# Patient Record
Sex: MALE | Race: OTHER | NOT HISPANIC OR LATINO | URBAN - METROPOLITAN AREA
[De-identification: names, ages, dates, MRNs, and addresses within clinical notes are randomized per-mention and may not be internally consistent; named-entity substitution may affect disease eponyms.]

---

## 2020-01-01 ENCOUNTER — INPATIENT (INPATIENT)
Facility: HOSPITAL | Age: 80
LOS: 7 days | End: 2020-04-09
Attending: STUDENT IN AN ORGANIZED HEALTH CARE EDUCATION/TRAINING PROGRAM | Admitting: STUDENT IN AN ORGANIZED HEALTH CARE EDUCATION/TRAINING PROGRAM
Payer: MEDICAID

## 2020-01-01 ENCOUNTER — OUTPATIENT (OUTPATIENT)
Dept: OUTPATIENT SERVICES | Facility: HOSPITAL | Age: 80
LOS: 1 days | End: 2020-01-01

## 2020-01-01 VITALS
TEMPERATURE: 97 F | RESPIRATION RATE: 29 BRPM | SYSTOLIC BLOOD PRESSURE: 122 MMHG | OXYGEN SATURATION: 92 % | HEART RATE: 120 BPM | DIASTOLIC BLOOD PRESSURE: 64 MMHG

## 2020-01-01 VITALS
DIASTOLIC BLOOD PRESSURE: 57 MMHG | RESPIRATION RATE: 97 BRPM | OXYGEN SATURATION: 96 % | SYSTOLIC BLOOD PRESSURE: 11 MMHG | HEART RATE: 116 BPM | TEMPERATURE: 101 F

## 2020-01-01 DIAGNOSIS — E11.9 TYPE 2 DIABETES MELLITUS WITHOUT COMPLICATIONS: ICD-10-CM

## 2020-01-01 DIAGNOSIS — B97.29 OTHER CORONAVIRUS AS THE CAUSE OF DISEASES CLASSIFIED ELSEWHERE: ICD-10-CM

## 2020-01-01 DIAGNOSIS — I10 ESSENTIAL (PRIMARY) HYPERTENSION: ICD-10-CM

## 2020-01-01 DIAGNOSIS — R68.89 OTHER GENERAL SYMPTOMS AND SIGNS: ICD-10-CM

## 2020-01-01 DIAGNOSIS — N17.9 ACUTE KIDNEY FAILURE, UNSPECIFIED: ICD-10-CM

## 2020-01-01 DIAGNOSIS — E87.0 HYPEROSMOLALITY AND HYPERNATREMIA: ICD-10-CM

## 2020-01-01 DIAGNOSIS — R06.02 SHORTNESS OF BREATH: ICD-10-CM

## 2020-01-01 DIAGNOSIS — Z71.89 OTHER SPECIFIED COUNSELING: ICD-10-CM

## 2020-01-01 LAB
ALBUMIN SERPL ELPH-MCNC: 3.2 G/DL — LOW (ref 3.3–5)
ALBUMIN SERPL ELPH-MCNC: 3.6 G/DL — SIGNIFICANT CHANGE UP (ref 3.3–5)
ALBUMIN SERPL ELPH-MCNC: 3.9 G/DL — SIGNIFICANT CHANGE UP (ref 3.3–5)
ALBUMIN SERPL ELPH-MCNC: 4.1 G/DL — SIGNIFICANT CHANGE UP (ref 3.3–5)
ALP SERPL-CCNC: 60 U/L — SIGNIFICANT CHANGE UP (ref 40–120)
ALP SERPL-CCNC: 62 U/L — SIGNIFICANT CHANGE UP (ref 40–120)
ALP SERPL-CCNC: 65 U/L — SIGNIFICANT CHANGE UP (ref 40–120)
ALP SERPL-CCNC: 66 U/L — SIGNIFICANT CHANGE UP (ref 40–120)
ALP SERPL-CCNC: 70 U/L — SIGNIFICANT CHANGE UP (ref 40–120)
ALP SERPL-CCNC: 75 U/L — SIGNIFICANT CHANGE UP (ref 40–120)
ALT FLD-CCNC: 25 U/L — SIGNIFICANT CHANGE UP (ref 4–41)
ALT FLD-CCNC: 28 U/L — SIGNIFICANT CHANGE UP (ref 4–41)
ALT FLD-CCNC: 34 U/L — SIGNIFICANT CHANGE UP (ref 4–41)
ALT FLD-CCNC: 35 U/L — SIGNIFICANT CHANGE UP (ref 4–41)
ALT FLD-CCNC: 42 U/L — HIGH (ref 4–41)
ALT FLD-CCNC: 43 U/L — HIGH (ref 4–41)
ANION GAP SERPL CALC-SCNC: 12 MMO/L — SIGNIFICANT CHANGE UP (ref 7–14)
ANION GAP SERPL CALC-SCNC: 13 MMO/L — SIGNIFICANT CHANGE UP (ref 7–14)
ANION GAP SERPL CALC-SCNC: 14 MMO/L — SIGNIFICANT CHANGE UP (ref 7–14)
ANION GAP SERPL CALC-SCNC: 14 MMO/L — SIGNIFICANT CHANGE UP (ref 7–14)
ANION GAP SERPL CALC-SCNC: 15 MMO/L — HIGH (ref 7–14)
ANION GAP SERPL CALC-SCNC: 16 MMO/L — HIGH (ref 7–14)
ANION GAP SERPL CALC-SCNC: 19 MMO/L — HIGH (ref 7–14)
ANISOCYTOSIS BLD QL: SLIGHT — SIGNIFICANT CHANGE UP
AST SERPL-CCNC: 34 U/L — SIGNIFICANT CHANGE UP (ref 4–40)
AST SERPL-CCNC: 41 U/L — HIGH (ref 4–40)
AST SERPL-CCNC: 47 U/L — HIGH (ref 4–40)
AST SERPL-CCNC: 54 U/L — HIGH (ref 4–40)
AST SERPL-CCNC: 60 U/L — HIGH (ref 4–40)
AST SERPL-CCNC: 60 U/L — HIGH (ref 4–40)
BASE EXCESS BLDV CALC-SCNC: 0.6 MMOL/L — SIGNIFICANT CHANGE UP
BASOPHILS # BLD AUTO: 0.01 K/UL — SIGNIFICANT CHANGE UP (ref 0–0.2)
BASOPHILS # BLD AUTO: 0.02 K/UL — SIGNIFICANT CHANGE UP (ref 0–0.2)
BASOPHILS # BLD AUTO: 0.02 K/UL — SIGNIFICANT CHANGE UP (ref 0–0.2)
BASOPHILS # BLD AUTO: 0.03 K/UL — SIGNIFICANT CHANGE UP (ref 0–0.2)
BASOPHILS NFR BLD AUTO: 0.2 % — SIGNIFICANT CHANGE UP (ref 0–2)
BASOPHILS NFR BLD AUTO: 0.3 % — SIGNIFICANT CHANGE UP (ref 0–2)
BASOPHILS NFR BLD AUTO: 0.3 % — SIGNIFICANT CHANGE UP (ref 0–2)
BASOPHILS NFR BLD AUTO: 0.4 % — SIGNIFICANT CHANGE UP (ref 0–2)
BASOPHILS NFR SPEC: 0 % — SIGNIFICANT CHANGE UP (ref 0–2)
BILIRUB SERPL-MCNC: 0.5 MG/DL — SIGNIFICANT CHANGE UP (ref 0.2–1.2)
BILIRUB SERPL-MCNC: 0.6 MG/DL — SIGNIFICANT CHANGE UP (ref 0.2–1.2)
BILIRUB SERPL-MCNC: 0.7 MG/DL — SIGNIFICANT CHANGE UP (ref 0.2–1.2)
BILIRUB SERPL-MCNC: 0.9 MG/DL — SIGNIFICANT CHANGE UP (ref 0.2–1.2)
BLASTS # FLD: 0 % — SIGNIFICANT CHANGE UP (ref 0–0)
BLOOD GAS VENOUS - CREATININE: 1.6 MG/DL — HIGH (ref 0.5–1.3)
BUN SERPL-MCNC: 26 MG/DL — HIGH (ref 7–23)
BUN SERPL-MCNC: 32 MG/DL — HIGH (ref 7–23)
BUN SERPL-MCNC: 34 MG/DL — HIGH (ref 7–23)
BUN SERPL-MCNC: 34 MG/DL — HIGH (ref 7–23)
BUN SERPL-MCNC: 37 MG/DL — HIGH (ref 7–23)
BUN SERPL-MCNC: 40 MG/DL — HIGH (ref 7–23)
BUN SERPL-MCNC: 40 MG/DL — HIGH (ref 7–23)
BUN SERPL-MCNC: 41 MG/DL — HIGH (ref 7–23)
BUN SERPL-MCNC: 41 MG/DL — HIGH (ref 7–23)
BUN SERPL-MCNC: 68 MG/DL — HIGH (ref 7–23)
CALCIUM SERPL-MCNC: 10 MG/DL — SIGNIFICANT CHANGE UP (ref 8.4–10.5)
CALCIUM SERPL-MCNC: 10.2 MG/DL — SIGNIFICANT CHANGE UP (ref 8.4–10.5)
CALCIUM SERPL-MCNC: 10.2 MG/DL — SIGNIFICANT CHANGE UP (ref 8.4–10.5)
CALCIUM SERPL-MCNC: 10.5 MG/DL — SIGNIFICANT CHANGE UP (ref 8.4–10.5)
CALCIUM SERPL-MCNC: 9.7 MG/DL — SIGNIFICANT CHANGE UP (ref 8.4–10.5)
CALCIUM SERPL-MCNC: 9.8 MG/DL — SIGNIFICANT CHANGE UP (ref 8.4–10.5)
CALCIUM SERPL-MCNC: 9.9 MG/DL — SIGNIFICANT CHANGE UP (ref 8.4–10.5)
CALCIUM SERPL-MCNC: 9.9 MG/DL — SIGNIFICANT CHANGE UP (ref 8.4–10.5)
CHLORIDE BLDV-SCNC: 104 MMOL/L — SIGNIFICANT CHANGE UP (ref 96–108)
CHLORIDE SERPL-SCNC: 101 MMOL/L — SIGNIFICANT CHANGE UP (ref 98–107)
CHLORIDE SERPL-SCNC: 101 MMOL/L — SIGNIFICANT CHANGE UP (ref 98–107)
CHLORIDE SERPL-SCNC: 104 MMOL/L — SIGNIFICANT CHANGE UP (ref 98–107)
CHLORIDE SERPL-SCNC: 106 MMOL/L — SIGNIFICANT CHANGE UP (ref 98–107)
CHLORIDE SERPL-SCNC: 114 MMOL/L — HIGH (ref 98–107)
CHLORIDE SERPL-SCNC: 115 MMOL/L — HIGH (ref 98–107)
CHLORIDE SERPL-SCNC: 97 MMOL/L — LOW (ref 98–107)
CHLORIDE SERPL-SCNC: 99 MMOL/L — SIGNIFICANT CHANGE UP (ref 98–107)
CO2 SERPL-SCNC: 16 MMOL/L — LOW (ref 22–31)
CO2 SERPL-SCNC: 20 MMOL/L — LOW (ref 22–31)
CO2 SERPL-SCNC: 20 MMOL/L — LOW (ref 22–31)
CO2 SERPL-SCNC: 21 MMOL/L — LOW (ref 22–31)
CO2 SERPL-SCNC: 21 MMOL/L — LOW (ref 22–31)
CO2 SERPL-SCNC: 22 MMOL/L — SIGNIFICANT CHANGE UP (ref 22–31)
CREAT SERPL-MCNC: 1.33 MG/DL — HIGH (ref 0.5–1.3)
CREAT SERPL-MCNC: 1.33 MG/DL — HIGH (ref 0.5–1.3)
CREAT SERPL-MCNC: 1.39 MG/DL — HIGH (ref 0.5–1.3)
CREAT SERPL-MCNC: 1.4 MG/DL — HIGH (ref 0.5–1.3)
CREAT SERPL-MCNC: 1.41 MG/DL — HIGH (ref 0.5–1.3)
CREAT SERPL-MCNC: 1.46 MG/DL — HIGH (ref 0.5–1.3)
CREAT SERPL-MCNC: 1.6 MG/DL — HIGH (ref 0.5–1.3)
CREAT SERPL-MCNC: 1.63 MG/DL — HIGH (ref 0.5–1.3)
CREAT SERPL-MCNC: 1.63 MG/DL — HIGH (ref 0.5–1.3)
CREAT SERPL-MCNC: 1.93 MG/DL — HIGH (ref 0.5–1.3)
CRP SERPL-MCNC: 152.6 MG/L — HIGH
CRP SERPL-MCNC: 177.4 MG/L — HIGH
CRP SERPL-MCNC: 325.7 MG/L — HIGH
D DIMER BLD IA.RAPID-MCNC: 1225 NG/ML — SIGNIFICANT CHANGE UP
D DIMER BLD IA.RAPID-MCNC: 456 NG/ML — SIGNIFICANT CHANGE UP
EOSINOPHIL # BLD AUTO: 0 K/UL — SIGNIFICANT CHANGE UP (ref 0–0.5)
EOSINOPHIL # BLD AUTO: 0.12 K/UL — SIGNIFICANT CHANGE UP (ref 0–0.5)
EOSINOPHIL # BLD AUTO: 0.14 K/UL — SIGNIFICANT CHANGE UP (ref 0–0.5)
EOSINOPHIL NFR BLD AUTO: 0 % — SIGNIFICANT CHANGE UP (ref 0–6)
EOSINOPHIL NFR BLD AUTO: 2.1 % — SIGNIFICANT CHANGE UP (ref 0–6)
EOSINOPHIL NFR BLD AUTO: 2.3 % — SIGNIFICANT CHANGE UP (ref 0–6)
EOSINOPHIL NFR FLD: 0 % — SIGNIFICANT CHANGE UP (ref 0–6)
FERRITIN SERPL-MCNC: 1643 NG/ML — HIGH (ref 30–400)
FERRITIN SERPL-MCNC: 2355 NG/ML — HIGH (ref 30–400)
FERRITIN SERPL-MCNC: 3342 NG/ML — HIGH (ref 30–400)
FERRITIN SERPL-MCNC: 807.8 NG/ML — HIGH (ref 30–400)
FERRITIN SERPL-MCNC: 9071 NG/ML — HIGH (ref 30–400)
GAS PNL BLDV: 135 MMOL/L — LOW (ref 136–146)
GIANT PLATELETS BLD QL SMEAR: PRESENT — SIGNIFICANT CHANGE UP
GLUCOSE BLDC GLUCOMTR-MCNC: 112 MG/DL — HIGH (ref 70–99)
GLUCOSE BLDC GLUCOMTR-MCNC: 115 MG/DL — HIGH (ref 70–99)
GLUCOSE BLDC GLUCOMTR-MCNC: 116 MG/DL — HIGH (ref 70–99)
GLUCOSE BLDC GLUCOMTR-MCNC: 119 MG/DL — HIGH (ref 70–99)
GLUCOSE BLDC GLUCOMTR-MCNC: 125 MG/DL — HIGH (ref 70–99)
GLUCOSE BLDC GLUCOMTR-MCNC: 127 MG/DL — HIGH (ref 70–99)
GLUCOSE BLDC GLUCOMTR-MCNC: 128 MG/DL — HIGH (ref 70–99)
GLUCOSE BLDC GLUCOMTR-MCNC: 132 MG/DL — HIGH (ref 70–99)
GLUCOSE BLDC GLUCOMTR-MCNC: 135 MG/DL — HIGH (ref 70–99)
GLUCOSE BLDC GLUCOMTR-MCNC: 142 MG/DL — HIGH (ref 70–99)
GLUCOSE BLDC GLUCOMTR-MCNC: 144 MG/DL — HIGH (ref 70–99)
GLUCOSE BLDC GLUCOMTR-MCNC: 146 MG/DL — HIGH (ref 70–99)
GLUCOSE BLDC GLUCOMTR-MCNC: 152 MG/DL — HIGH (ref 70–99)
GLUCOSE BLDC GLUCOMTR-MCNC: 153 MG/DL — HIGH (ref 70–99)
GLUCOSE BLDC GLUCOMTR-MCNC: 161 MG/DL — HIGH (ref 70–99)
GLUCOSE BLDC GLUCOMTR-MCNC: 170 MG/DL — HIGH (ref 70–99)
GLUCOSE BLDC GLUCOMTR-MCNC: 172 MG/DL — HIGH (ref 70–99)
GLUCOSE BLDC GLUCOMTR-MCNC: 175 MG/DL — HIGH (ref 70–99)
GLUCOSE BLDC GLUCOMTR-MCNC: 181 MG/DL — HIGH (ref 70–99)
GLUCOSE BLDC GLUCOMTR-MCNC: 187 MG/DL — HIGH (ref 70–99)
GLUCOSE BLDC GLUCOMTR-MCNC: 187 MG/DL — HIGH (ref 70–99)
GLUCOSE BLDC GLUCOMTR-MCNC: 194 MG/DL — HIGH (ref 70–99)
GLUCOSE BLDC GLUCOMTR-MCNC: 199 MG/DL — HIGH (ref 70–99)
GLUCOSE BLDC GLUCOMTR-MCNC: 216 MG/DL — HIGH (ref 70–99)
GLUCOSE BLDC GLUCOMTR-MCNC: 244 MG/DL — HIGH (ref 70–99)
GLUCOSE BLDC GLUCOMTR-MCNC: 260 MG/DL — HIGH (ref 70–99)
GLUCOSE BLDC GLUCOMTR-MCNC: 271 MG/DL — HIGH (ref 70–99)
GLUCOSE BLDC GLUCOMTR-MCNC: 305 MG/DL — HIGH (ref 70–99)
GLUCOSE BLDV-MCNC: 161 MG/DL — HIGH (ref 70–99)
GLUCOSE SERPL-MCNC: 109 MG/DL — HIGH (ref 70–99)
GLUCOSE SERPL-MCNC: 122 MG/DL — HIGH (ref 70–99)
GLUCOSE SERPL-MCNC: 122 MG/DL — HIGH (ref 70–99)
GLUCOSE SERPL-MCNC: 123 MG/DL — HIGH (ref 70–99)
GLUCOSE SERPL-MCNC: 123 MG/DL — HIGH (ref 70–99)
GLUCOSE SERPL-MCNC: 128 MG/DL — HIGH (ref 70–99)
GLUCOSE SERPL-MCNC: 146 MG/DL — HIGH (ref 70–99)
GLUCOSE SERPL-MCNC: 163 MG/DL — HIGH (ref 70–99)
GLUCOSE SERPL-MCNC: 211 MG/DL — HIGH (ref 70–99)
GLUCOSE SERPL-MCNC: 277 MG/DL — HIGH (ref 70–99)
HBA1C BLD-MCNC: 7 % — HIGH (ref 4–5.6)
HCO3 BLDV-SCNC: 23 MMOL/L — SIGNIFICANT CHANGE UP (ref 20–27)
HCT VFR BLD CALC: 34 % — LOW (ref 39–50)
HCT VFR BLD CALC: 36.4 % — LOW (ref 39–50)
HCT VFR BLD CALC: 36.9 % — LOW (ref 39–50)
HCT VFR BLD CALC: 38.7 % — LOW (ref 39–50)
HCT VFR BLD CALC: 39.6 % — SIGNIFICANT CHANGE UP (ref 39–50)
HCT VFR BLD CALC: 39.9 % — SIGNIFICANT CHANGE UP (ref 39–50)
HCT VFR BLD CALC: 39.9 % — SIGNIFICANT CHANGE UP (ref 39–50)
HCT VFR BLDV CALC: 42.1 % — SIGNIFICANT CHANGE UP (ref 39–51)
HGB BLD-MCNC: 11.3 G/DL — LOW (ref 13–17)
HGB BLD-MCNC: 12 G/DL — LOW (ref 13–17)
HGB BLD-MCNC: 12.2 G/DL — LOW (ref 13–17)
HGB BLD-MCNC: 12.7 G/DL — LOW (ref 13–17)
HGB BLD-MCNC: 12.7 G/DL — LOW (ref 13–17)
HGB BLD-MCNC: 13.4 G/DL — SIGNIFICANT CHANGE UP (ref 13–17)
HGB BLD-MCNC: 13.7 G/DL — SIGNIFICANT CHANGE UP (ref 13–17)
HGB BLD-MCNC: 13.7 G/DL — SIGNIFICANT CHANGE UP (ref 13–17)
HGB BLD-MCNC: 13.8 G/DL — SIGNIFICANT CHANGE UP (ref 13–17)
HGB BLDV-MCNC: 13.7 G/DL — SIGNIFICANT CHANGE UP (ref 13–17)
IMM GRANULOCYTES NFR BLD AUTO: 0.3 % — SIGNIFICANT CHANGE UP (ref 0–1.5)
IMM GRANULOCYTES NFR BLD AUTO: 0.3 % — SIGNIFICANT CHANGE UP (ref 0–1.5)
IMM GRANULOCYTES NFR BLD AUTO: 0.5 % — SIGNIFICANT CHANGE UP (ref 0–1.5)
IMM GRANULOCYTES NFR BLD AUTO: 0.5 % — SIGNIFICANT CHANGE UP (ref 0–1.5)
IMM GRANULOCYTES NFR BLD AUTO: 0.7 % — SIGNIFICANT CHANGE UP (ref 0–1.5)
IMM GRANULOCYTES NFR BLD AUTO: 1.3 % — SIGNIFICANT CHANGE UP (ref 0–1.5)
LACTATE BLDV-MCNC: 2.1 MMOL/L — HIGH (ref 0.5–2)
LYMPHOCYTES # BLD AUTO: 0.95 K/UL — LOW (ref 1–3.3)
LYMPHOCYTES # BLD AUTO: 0.95 K/UL — LOW (ref 1–3.3)
LYMPHOCYTES # BLD AUTO: 1.02 K/UL — SIGNIFICANT CHANGE UP (ref 1–3.3)
LYMPHOCYTES # BLD AUTO: 1.05 K/UL — SIGNIFICANT CHANGE UP (ref 1–3.3)
LYMPHOCYTES # BLD AUTO: 1.19 K/UL — SIGNIFICANT CHANGE UP (ref 1–3.3)
LYMPHOCYTES # BLD AUTO: 1.35 K/UL — SIGNIFICANT CHANGE UP (ref 1–3.3)
LYMPHOCYTES # BLD AUTO: 14.1 % — SIGNIFICANT CHANGE UP (ref 13–44)
LYMPHOCYTES # BLD AUTO: 15.8 % — SIGNIFICANT CHANGE UP (ref 13–44)
LYMPHOCYTES # BLD AUTO: 16.8 % — SIGNIFICANT CHANGE UP (ref 13–44)
LYMPHOCYTES # BLD AUTO: 17 % — SIGNIFICANT CHANGE UP (ref 13–44)
LYMPHOCYTES # BLD AUTO: 18 % — SIGNIFICANT CHANGE UP (ref 13–44)
LYMPHOCYTES # BLD AUTO: 21 % — SIGNIFICANT CHANGE UP (ref 13–44)
LYMPHOCYTES NFR SPEC AUTO: 7.2 % — LOW (ref 13–44)
MAGNESIUM SERPL-MCNC: 1.9 MG/DL — SIGNIFICANT CHANGE UP (ref 1.6–2.6)
MAGNESIUM SERPL-MCNC: 2 MG/DL — SIGNIFICANT CHANGE UP (ref 1.6–2.6)
MAGNESIUM SERPL-MCNC: 2 MG/DL — SIGNIFICANT CHANGE UP (ref 1.6–2.6)
MAGNESIUM SERPL-MCNC: 2.2 MG/DL — SIGNIFICANT CHANGE UP (ref 1.6–2.6)
MAGNESIUM SERPL-MCNC: 2.6 MG/DL — SIGNIFICANT CHANGE UP (ref 1.6–2.6)
MCHC RBC-ENTMCNC: 29.6 PG — SIGNIFICANT CHANGE UP (ref 27–34)
MCHC RBC-ENTMCNC: 29.9 PG — SIGNIFICANT CHANGE UP (ref 27–34)
MCHC RBC-ENTMCNC: 30.2 PG — SIGNIFICANT CHANGE UP (ref 27–34)
MCHC RBC-ENTMCNC: 30.4 PG — SIGNIFICANT CHANGE UP (ref 27–34)
MCHC RBC-ENTMCNC: 30.4 PG — SIGNIFICANT CHANGE UP (ref 27–34)
MCHC RBC-ENTMCNC: 30.6 PG — SIGNIFICANT CHANGE UP (ref 27–34)
MCHC RBC-ENTMCNC: 30.9 PG — SIGNIFICANT CHANGE UP (ref 27–34)
MCHC RBC-ENTMCNC: 33 % — SIGNIFICANT CHANGE UP (ref 32–36)
MCHC RBC-ENTMCNC: 33.1 % — SIGNIFICANT CHANGE UP (ref 32–36)
MCHC RBC-ENTMCNC: 33.2 % — SIGNIFICANT CHANGE UP (ref 32–36)
MCHC RBC-ENTMCNC: 34.3 % — SIGNIFICANT CHANGE UP (ref 32–36)
MCHC RBC-ENTMCNC: 34.4 % — SIGNIFICANT CHANGE UP (ref 32–36)
MCHC RBC-ENTMCNC: 34.4 % — SIGNIFICANT CHANGE UP (ref 32–36)
MCHC RBC-ENTMCNC: 34.6 % — SIGNIFICANT CHANGE UP (ref 32–36)
MCV RBC AUTO: 87.8 FL — SIGNIFICANT CHANGE UP (ref 80–100)
MCV RBC AUTO: 87.9 FL — SIGNIFICANT CHANGE UP (ref 80–100)
MCV RBC AUTO: 88.9 FL — SIGNIFICANT CHANGE UP (ref 80–100)
MCV RBC AUTO: 88.9 FL — SIGNIFICANT CHANGE UP (ref 80–100)
MCV RBC AUTO: 89.3 FL — SIGNIFICANT CHANGE UP (ref 80–100)
MCV RBC AUTO: 89.4 FL — SIGNIFICANT CHANGE UP (ref 80–100)
MCV RBC AUTO: 89.7 FL — SIGNIFICANT CHANGE UP (ref 80–100)
MCV RBC AUTO: 90.4 FL — SIGNIFICANT CHANGE UP (ref 80–100)
MCV RBC AUTO: 90.9 FL — SIGNIFICANT CHANGE UP (ref 80–100)
METAMYELOCYTES # FLD: 0 % — SIGNIFICANT CHANGE UP (ref 0–1)
MICROCYTES BLD QL: SLIGHT — SIGNIFICANT CHANGE UP
MONOCYTES # BLD AUTO: 0.34 K/UL — SIGNIFICANT CHANGE UP (ref 0–0.9)
MONOCYTES # BLD AUTO: 0.41 K/UL — SIGNIFICANT CHANGE UP (ref 0–0.9)
MONOCYTES # BLD AUTO: 0.44 K/UL — SIGNIFICANT CHANGE UP (ref 0–0.9)
MONOCYTES # BLD AUTO: 0.45 K/UL — SIGNIFICANT CHANGE UP (ref 0–0.9)
MONOCYTES # BLD AUTO: 0.46 K/UL — SIGNIFICANT CHANGE UP (ref 0–0.9)
MONOCYTES # BLD AUTO: 0.59 K/UL — SIGNIFICANT CHANGE UP (ref 0–0.9)
MONOCYTES NFR BLD AUTO: 4.7 % — SIGNIFICANT CHANGE UP (ref 2–14)
MONOCYTES NFR BLD AUTO: 6 % — SIGNIFICANT CHANGE UP (ref 2–14)
MONOCYTES NFR BLD AUTO: 7.2 % — SIGNIFICANT CHANGE UP (ref 2–14)
MONOCYTES NFR BLD AUTO: 7.4 % — SIGNIFICANT CHANGE UP (ref 2–14)
MONOCYTES NFR BLD AUTO: 7.8 % — SIGNIFICANT CHANGE UP (ref 2–14)
MONOCYTES NFR BLD AUTO: 9.8 % — SIGNIFICANT CHANGE UP (ref 2–14)
MONOCYTES NFR BLD: 13.5 % — HIGH (ref 2–9)
MYELOCYTES NFR BLD: 0 % — SIGNIFICANT CHANGE UP (ref 0–0)
NEUTROPHIL AB SER-ACNC: 70.3 % — SIGNIFICANT CHANGE UP (ref 43–77)
NEUTROPHILS # BLD AUTO: 3.92 K/UL — SIGNIFICANT CHANGE UP (ref 1.8–7.4)
NEUTROPHILS # BLD AUTO: 4.2 K/UL — SIGNIFICANT CHANGE UP (ref 1.8–7.4)
NEUTROPHILS # BLD AUTO: 4.25 K/UL — SIGNIFICANT CHANGE UP (ref 1.8–7.4)
NEUTROPHILS # BLD AUTO: 4.3 K/UL — SIGNIFICANT CHANGE UP (ref 1.8–7.4)
NEUTROPHILS # BLD AUTO: 4.65 K/UL — SIGNIFICANT CHANGE UP (ref 1.8–7.4)
NEUTROPHILS # BLD AUTO: 7.64 K/UL — HIGH (ref 1.8–7.4)
NEUTROPHILS NFR BLD AUTO: 69 % — SIGNIFICANT CHANGE UP (ref 43–77)
NEUTROPHILS NFR BLD AUTO: 71.5 % — SIGNIFICANT CHANGE UP (ref 43–77)
NEUTROPHILS NFR BLD AUTO: 74.5 % — SIGNIFICANT CHANGE UP (ref 43–77)
NEUTROPHILS NFR BLD AUTO: 75.1 % — SIGNIFICANT CHANGE UP (ref 43–77)
NEUTROPHILS NFR BLD AUTO: 75.1 % — SIGNIFICANT CHANGE UP (ref 43–77)
NEUTROPHILS NFR BLD AUTO: 79.6 % — HIGH (ref 43–77)
NEUTS BAND # BLD: 3.6 % — SIGNIFICANT CHANGE UP (ref 0–6)
NRBC # BLD: 1 /100WBC — SIGNIFICANT CHANGE UP
NRBC # FLD: 0 K/UL — SIGNIFICANT CHANGE UP (ref 0–0)
NRBC # FLD: 0.08 K/UL — SIGNIFICANT CHANGE UP (ref 0–0)
NRBC # FLD: 0.86 K/UL — SIGNIFICANT CHANGE UP (ref 0–0)
NRBC FLD-RTO: 2.9 — SIGNIFICANT CHANGE UP
OTHER - HEMATOLOGY %: 0 — SIGNIFICANT CHANGE UP
PCO2 BLDV: 46 MMHG — SIGNIFICANT CHANGE UP (ref 41–51)
PH BLDV: 7.36 PH — SIGNIFICANT CHANGE UP (ref 7.32–7.43)
PHOSPHATE SERPL-MCNC: 2.7 MG/DL — SIGNIFICANT CHANGE UP (ref 2.5–4.5)
PHOSPHATE SERPL-MCNC: 2.7 MG/DL — SIGNIFICANT CHANGE UP (ref 2.5–4.5)
PHOSPHATE SERPL-MCNC: 3.1 MG/DL — SIGNIFICANT CHANGE UP (ref 2.5–4.5)
PHOSPHATE SERPL-MCNC: 3.4 MG/DL — SIGNIFICANT CHANGE UP (ref 2.5–4.5)
PLATELET # BLD AUTO: 185 K/UL — SIGNIFICANT CHANGE UP (ref 150–400)
PLATELET # BLD AUTO: 193 K/UL — SIGNIFICANT CHANGE UP (ref 150–400)
PLATELET # BLD AUTO: 195 K/UL — SIGNIFICANT CHANGE UP (ref 150–400)
PLATELET # BLD AUTO: 200 K/UL — SIGNIFICANT CHANGE UP (ref 150–400)
PLATELET # BLD AUTO: 206 K/UL — SIGNIFICANT CHANGE UP (ref 150–400)
PLATELET # BLD AUTO: 206 K/UL — SIGNIFICANT CHANGE UP (ref 150–400)
PLATELET # BLD AUTO: 210 K/UL — SIGNIFICANT CHANGE UP (ref 150–400)
PLATELET # BLD AUTO: 221 K/UL — SIGNIFICANT CHANGE UP (ref 150–400)
PLATELET # BLD AUTO: 280 K/UL — SIGNIFICANT CHANGE UP (ref 150–400)
PLATELET COUNT - ESTIMATE: NORMAL — SIGNIFICANT CHANGE UP
PMV BLD: 10.1 FL — SIGNIFICANT CHANGE UP (ref 7–13)
PMV BLD: 10.5 FL — SIGNIFICANT CHANGE UP (ref 7–13)
PMV BLD: 10.7 FL — SIGNIFICANT CHANGE UP (ref 7–13)
PMV BLD: 11.4 FL — SIGNIFICANT CHANGE UP (ref 7–13)
PMV BLD: 11.5 FL — SIGNIFICANT CHANGE UP (ref 7–13)
PMV BLD: 11.6 FL — SIGNIFICANT CHANGE UP (ref 7–13)
PMV BLD: 11.6 FL — SIGNIFICANT CHANGE UP (ref 7–13)
PO2 BLDV: 26 MMHG — LOW (ref 35–40)
POIKILOCYTOSIS BLD QL AUTO: SLIGHT — SIGNIFICANT CHANGE UP
POLYCHROMASIA BLD QL SMEAR: SLIGHT — SIGNIFICANT CHANGE UP
POTASSIUM BLDV-SCNC: 4.1 MMOL/L — SIGNIFICANT CHANGE UP (ref 3.4–4.5)
POTASSIUM SERPL-MCNC: 4.1 MMOL/L — SIGNIFICANT CHANGE UP (ref 3.5–5.3)
POTASSIUM SERPL-MCNC: 4.2 MMOL/L — SIGNIFICANT CHANGE UP (ref 3.5–5.3)
POTASSIUM SERPL-MCNC: 4.4 MMOL/L — SIGNIFICANT CHANGE UP (ref 3.5–5.3)
POTASSIUM SERPL-MCNC: 4.4 MMOL/L — SIGNIFICANT CHANGE UP (ref 3.5–5.3)
POTASSIUM SERPL-MCNC: 4.5 MMOL/L — SIGNIFICANT CHANGE UP (ref 3.5–5.3)
POTASSIUM SERPL-MCNC: 4.6 MMOL/L — SIGNIFICANT CHANGE UP (ref 3.5–5.3)
POTASSIUM SERPL-MCNC: 4.6 MMOL/L — SIGNIFICANT CHANGE UP (ref 3.5–5.3)
POTASSIUM SERPL-MCNC: 4.7 MMOL/L — SIGNIFICANT CHANGE UP (ref 3.5–5.3)
POTASSIUM SERPL-MCNC: 4.8 MMOL/L — SIGNIFICANT CHANGE UP (ref 3.5–5.3)
POTASSIUM SERPL-SCNC: 4.1 MMOL/L — SIGNIFICANT CHANGE UP (ref 3.5–5.3)
POTASSIUM SERPL-SCNC: 4.2 MMOL/L — SIGNIFICANT CHANGE UP (ref 3.5–5.3)
POTASSIUM SERPL-SCNC: 4.4 MMOL/L — SIGNIFICANT CHANGE UP (ref 3.5–5.3)
POTASSIUM SERPL-SCNC: 4.4 MMOL/L — SIGNIFICANT CHANGE UP (ref 3.5–5.3)
POTASSIUM SERPL-SCNC: 4.5 MMOL/L — SIGNIFICANT CHANGE UP (ref 3.5–5.3)
POTASSIUM SERPL-SCNC: 4.6 MMOL/L — SIGNIFICANT CHANGE UP (ref 3.5–5.3)
POTASSIUM SERPL-SCNC: 4.6 MMOL/L — SIGNIFICANT CHANGE UP (ref 3.5–5.3)
POTASSIUM SERPL-SCNC: 4.7 MMOL/L — SIGNIFICANT CHANGE UP (ref 3.5–5.3)
POTASSIUM SERPL-SCNC: 4.8 MMOL/L — SIGNIFICANT CHANGE UP (ref 3.5–5.3)
PROCALCITONIN SERPL-MCNC: 0.49 NG/ML — HIGH (ref 0.02–0.1)
PROCALCITONIN SERPL-MCNC: 0.59 NG/ML — HIGH (ref 0.02–0.1)
PROCALCITONIN SERPL-MCNC: 0.79 NG/ML — HIGH (ref 0.02–0.1)
PROCALCITONIN SERPL-MCNC: 1.39 NG/ML — HIGH (ref 0.02–0.1)
PROCALCITONIN SERPL-MCNC: 2.79 NG/ML — HIGH (ref 0.02–0.1)
PROMYELOCYTES # FLD: 0 % — SIGNIFICANT CHANGE UP (ref 0–0)
PROT SERPL-MCNC: 7.3 G/DL — SIGNIFICANT CHANGE UP (ref 6–8.3)
PROT SERPL-MCNC: 7.5 G/DL — SIGNIFICANT CHANGE UP (ref 6–8.3)
PROT SERPL-MCNC: 7.7 G/DL — SIGNIFICANT CHANGE UP (ref 6–8.3)
PROT SERPL-MCNC: 7.9 G/DL — SIGNIFICANT CHANGE UP (ref 6–8.3)
PROT SERPL-MCNC: 8 G/DL — SIGNIFICANT CHANGE UP (ref 6–8.3)
PROT SERPL-MCNC: 8.1 G/DL — SIGNIFICANT CHANGE UP (ref 6–8.3)
RBC # BLD: 3.74 M/UL — LOW (ref 4.2–5.8)
RBC # BLD: 4.06 M/UL — LOW (ref 4.2–5.8)
RBC # BLD: 4.08 M/UL — LOW (ref 4.2–5.8)
RBC # BLD: 4.15 M/UL — LOW (ref 4.2–5.8)
RBC # BLD: 4.15 M/UL — LOW (ref 4.2–5.8)
RBC # BLD: 4.33 M/UL — SIGNIFICANT CHANGE UP (ref 4.2–5.8)
RBC # BLD: 4.47 M/UL — SIGNIFICANT CHANGE UP (ref 4.2–5.8)
RBC # BLD: 4.51 M/UL — SIGNIFICANT CHANGE UP (ref 4.2–5.8)
RBC # BLD: 4.54 M/UL — SIGNIFICANT CHANGE UP (ref 4.2–5.8)
RBC # FLD: 12.8 % — SIGNIFICANT CHANGE UP (ref 10.3–14.5)
RBC # FLD: 12.9 % — SIGNIFICANT CHANGE UP (ref 10.3–14.5)
RBC # FLD: 13 % — SIGNIFICANT CHANGE UP (ref 10.3–14.5)
RBC # FLD: 13.1 % — SIGNIFICANT CHANGE UP (ref 10.3–14.5)
RBC # FLD: 13.2 % — SIGNIFICANT CHANGE UP (ref 10.3–14.5)
RBC # FLD: 14.4 % — SIGNIFICANT CHANGE UP (ref 10.3–14.5)
SAO2 % BLDV: 37.1 % — LOW (ref 60–85)
SARS-COV-2 RNA SPEC QL NAA+PROBE: DETECTED
SCHISTOCYTES BLD QL AUTO: SLIGHT — SIGNIFICANT CHANGE UP
SODIUM SERPL-SCNC: 133 MMOL/L — LOW (ref 135–145)
SODIUM SERPL-SCNC: 134 MMOL/L — LOW (ref 135–145)
SODIUM SERPL-SCNC: 137 MMOL/L — SIGNIFICANT CHANGE UP (ref 135–145)
SODIUM SERPL-SCNC: 137 MMOL/L — SIGNIFICANT CHANGE UP (ref 135–145)
SODIUM SERPL-SCNC: 141 MMOL/L — SIGNIFICANT CHANGE UP (ref 135–145)
SODIUM SERPL-SCNC: 142 MMOL/L — SIGNIFICANT CHANGE UP (ref 135–145)
SODIUM SERPL-SCNC: 147 MMOL/L — HIGH (ref 135–145)
SODIUM SERPL-SCNC: 150 MMOL/L — HIGH (ref 135–145)
TROPONIN T, HIGH SENSITIVITY: 16 NG/L — SIGNIFICANT CHANGE UP (ref ?–14)
TROPONIN T, HIGH SENSITIVITY: 19 NG/L — SIGNIFICANT CHANGE UP (ref ?–14)
VARIANT LYMPHS # BLD: 4.5 % — SIGNIFICANT CHANGE UP
WBC # BLD: 16.6 K/UL — HIGH (ref 3.8–10.5)
WBC # BLD: 29.21 K/UL — HIGH (ref 3.8–10.5)
WBC # BLD: 5.64 K/UL — SIGNIFICANT CHANGE UP (ref 3.8–10.5)
WBC # BLD: 5.66 K/UL — SIGNIFICANT CHANGE UP (ref 3.8–10.5)
WBC # BLD: 5.66 K/UL — SIGNIFICANT CHANGE UP (ref 3.8–10.5)
WBC # BLD: 5.68 K/UL — SIGNIFICANT CHANGE UP (ref 3.8–10.5)
WBC # BLD: 6.02 K/UL — SIGNIFICANT CHANGE UP (ref 3.8–10.5)
WBC # BLD: 6.19 K/UL — SIGNIFICANT CHANGE UP (ref 3.8–10.5)
WBC # BLD: 9.59 K/UL — SIGNIFICANT CHANGE UP (ref 3.8–10.5)
WBC # FLD AUTO: 16.6 K/UL — HIGH (ref 3.8–10.5)
WBC # FLD AUTO: 29.21 K/UL — HIGH (ref 3.8–10.5)
WBC # FLD AUTO: 5.64 K/UL — SIGNIFICANT CHANGE UP (ref 3.8–10.5)
WBC # FLD AUTO: 5.66 K/UL — SIGNIFICANT CHANGE UP (ref 3.8–10.5)
WBC # FLD AUTO: 5.66 K/UL — SIGNIFICANT CHANGE UP (ref 3.8–10.5)
WBC # FLD AUTO: 5.68 K/UL — SIGNIFICANT CHANGE UP (ref 3.8–10.5)
WBC # FLD AUTO: 6.02 K/UL — SIGNIFICANT CHANGE UP (ref 3.8–10.5)
WBC # FLD AUTO: 6.19 K/UL — SIGNIFICANT CHANGE UP (ref 3.8–10.5)
WBC # FLD AUTO: 9.59 K/UL — SIGNIFICANT CHANGE UP (ref 3.8–10.5)

## 2020-01-01 PROCEDURE — 99232 SBSQ HOSP IP/OBS MODERATE 35: CPT

## 2020-01-01 PROCEDURE — 71045 X-RAY EXAM CHEST 1 VIEW: CPT | Mod: 26

## 2020-01-01 PROCEDURE — 99233 SBSQ HOSP IP/OBS HIGH 50: CPT

## 2020-01-01 PROCEDURE — 99223 1ST HOSP IP/OBS HIGH 75: CPT

## 2020-01-01 PROCEDURE — 93970 EXTREMITY STUDY: CPT | Mod: 26

## 2020-01-01 RX ORDER — ENOXAPARIN SODIUM 100 MG/ML
90 INJECTION SUBCUTANEOUS EVERY 12 HOURS
Refills: 0 | Status: DISCONTINUED | OUTPATIENT
Start: 2020-01-01 | End: 2020-01-01

## 2020-01-01 RX ORDER — GLUCAGON INJECTION, SOLUTION 0.5 MG/.1ML
1 INJECTION, SOLUTION SUBCUTANEOUS ONCE
Refills: 0 | Status: DISCONTINUED | OUTPATIENT
Start: 2020-01-01 | End: 2020-01-01

## 2020-01-01 RX ORDER — ENOXAPARIN SODIUM 100 MG/ML
40 INJECTION SUBCUTANEOUS DAILY
Refills: 0 | Status: DISCONTINUED | OUTPATIENT
Start: 2020-01-01 | End: 2020-01-01

## 2020-01-01 RX ORDER — ACETAMINOPHEN 500 MG
650 TABLET ORAL EVERY 4 HOURS
Refills: 0 | Status: DISCONTINUED | OUTPATIENT
Start: 2020-01-01 | End: 2020-01-01

## 2020-01-01 RX ORDER — DEXTROSE 50 % IN WATER 50 %
25 SYRINGE (ML) INTRAVENOUS ONCE
Refills: 0 | Status: DISCONTINUED | OUTPATIENT
Start: 2020-01-01 | End: 2020-01-01

## 2020-01-01 RX ORDER — SODIUM CHLORIDE 9 MG/ML
1000 INJECTION, SOLUTION INTRAVENOUS
Refills: 0 | Status: DISCONTINUED | OUTPATIENT
Start: 2020-01-01 | End: 2020-01-01

## 2020-01-01 RX ORDER — INSULIN LISPRO 100/ML
VIAL (ML) SUBCUTANEOUS AT BEDTIME
Refills: 0 | Status: DISCONTINUED | OUTPATIENT
Start: 2020-01-01 | End: 2020-01-01

## 2020-01-01 RX ORDER — DEXTROSE 50 % IN WATER 50 %
12.5 SYRINGE (ML) INTRAVENOUS ONCE
Refills: 0 | Status: DISCONTINUED | OUTPATIENT
Start: 2020-01-01 | End: 2020-01-01

## 2020-01-01 RX ORDER — INSULIN LISPRO 100/ML
VIAL (ML) SUBCUTANEOUS
Refills: 0 | Status: DISCONTINUED | OUTPATIENT
Start: 2020-01-01 | End: 2020-01-01

## 2020-01-01 RX ORDER — DEXTROSE 50 % IN WATER 50 %
15 SYRINGE (ML) INTRAVENOUS ONCE
Refills: 0 | Status: DISCONTINUED | OUTPATIENT
Start: 2020-01-01 | End: 2020-01-01

## 2020-01-01 RX ORDER — LISINOPRIL 2.5 MG/1
10 TABLET ORAL DAILY
Refills: 0 | Status: DISCONTINUED | OUTPATIENT
Start: 2020-01-01 | End: 2020-01-01

## 2020-01-01 RX ORDER — ACETAMINOPHEN 500 MG
975 TABLET ORAL ONCE
Refills: 0 | Status: COMPLETED | OUTPATIENT
Start: 2020-01-01 | End: 2020-01-01

## 2020-01-01 RX ORDER — INSULIN GLARGINE 100 [IU]/ML
4 INJECTION, SOLUTION SUBCUTANEOUS AT BEDTIME
Refills: 0 | Status: DISCONTINUED | OUTPATIENT
Start: 2020-01-01 | End: 2020-01-01

## 2020-01-01 RX ORDER — TAMSULOSIN HYDROCHLORIDE 0.4 MG/1
0.4 CAPSULE ORAL AT BEDTIME
Refills: 0 | Status: DISCONTINUED | OUTPATIENT
Start: 2020-01-01 | End: 2020-01-01

## 2020-01-01 RX ORDER — TAMSULOSIN HYDROCHLORIDE 0.4 MG/1
1 CAPSULE ORAL
Qty: 0 | Refills: 0 | DISCHARGE

## 2020-01-01 RX ORDER — SODIUM CHLORIDE 9 MG/ML
500 INJECTION INTRAMUSCULAR; INTRAVENOUS; SUBCUTANEOUS ONCE
Refills: 0 | Status: COMPLETED | OUTPATIENT
Start: 2020-01-01 | End: 2020-01-01

## 2020-01-01 RX ORDER — HEPARIN SODIUM 5000 [USP'U]/ML
5000 INJECTION INTRAVENOUS; SUBCUTANEOUS EVERY 8 HOURS
Refills: 0 | Status: DISCONTINUED | OUTPATIENT
Start: 2020-01-01 | End: 2020-01-01

## 2020-01-01 RX ORDER — MORPHINE SULFATE 50 MG/1
1 CAPSULE, EXTENDED RELEASE ORAL ONCE
Refills: 0 | Status: DISCONTINUED | OUTPATIENT
Start: 2020-01-01 | End: 2020-01-01

## 2020-01-01 RX ORDER — PIPERACILLIN AND TAZOBACTAM 4; .5 G/20ML; G/20ML
3.38 INJECTION, POWDER, LYOPHILIZED, FOR SOLUTION INTRAVENOUS EVERY 8 HOURS
Refills: 0 | Status: DISCONTINUED | OUTPATIENT
Start: 2020-01-01 | End: 2020-01-01

## 2020-01-01 RX ORDER — INSULIN LISPRO 100/ML
2 VIAL (ML) SUBCUTANEOUS
Refills: 0 | Status: DISCONTINUED | OUTPATIENT
Start: 2020-01-01 | End: 2020-01-01

## 2020-01-01 RX ORDER — ACETAMINOPHEN 500 MG
2 TABLET ORAL
Qty: 30 | Refills: 0
Start: 2020-01-01

## 2020-01-01 RX ORDER — SODIUM CHLORIDE 9 MG/ML
1000 INJECTION INTRAMUSCULAR; INTRAVENOUS; SUBCUTANEOUS
Refills: 0 | Status: DISCONTINUED | OUTPATIENT
Start: 2020-01-01 | End: 2020-01-01

## 2020-01-01 RX ORDER — LISINOPRIL 2.5 MG/1
1 TABLET ORAL
Qty: 0 | Refills: 0 | DISCHARGE

## 2020-01-01 RX ADMIN — SODIUM CHLORIDE 500 MILLILITER(S): 9 INJECTION INTRAMUSCULAR; INTRAVENOUS; SUBCUTANEOUS at 03:39

## 2020-01-01 RX ADMIN — Medication 3: at 10:32

## 2020-01-01 RX ADMIN — PIPERACILLIN AND TAZOBACTAM 25 GRAM(S): 4; .5 INJECTION, POWDER, LYOPHILIZED, FOR SOLUTION INTRAVENOUS at 23:50

## 2020-01-01 RX ADMIN — HEPARIN SODIUM 5000 UNIT(S): 5000 INJECTION INTRAVENOUS; SUBCUTANEOUS at 05:41

## 2020-01-01 RX ADMIN — SODIUM CHLORIDE 60 MILLILITER(S): 9 INJECTION INTRAMUSCULAR; INTRAVENOUS; SUBCUTANEOUS at 23:10

## 2020-01-01 RX ADMIN — ENOXAPARIN SODIUM 90 MILLIGRAM(S): 100 INJECTION SUBCUTANEOUS at 10:32

## 2020-01-01 RX ADMIN — Medication 1: at 12:49

## 2020-01-01 RX ADMIN — HEPARIN SODIUM 5000 UNIT(S): 5000 INJECTION INTRAVENOUS; SUBCUTANEOUS at 22:50

## 2020-01-01 RX ADMIN — HEPARIN SODIUM 5000 UNIT(S): 5000 INJECTION INTRAVENOUS; SUBCUTANEOUS at 00:07

## 2020-01-01 RX ADMIN — ENOXAPARIN SODIUM 90 MILLIGRAM(S): 100 INJECTION SUBCUTANEOUS at 18:31

## 2020-01-01 RX ADMIN — Medication 3: at 13:06

## 2020-01-01 RX ADMIN — LISINOPRIL 10 MILLIGRAM(S): 2.5 TABLET ORAL at 06:44

## 2020-01-01 RX ADMIN — Medication 1: at 13:16

## 2020-01-01 RX ADMIN — HEPARIN SODIUM 5000 UNIT(S): 5000 INJECTION INTRAVENOUS; SUBCUTANEOUS at 05:45

## 2020-01-01 RX ADMIN — ENOXAPARIN SODIUM 40 MILLIGRAM(S): 100 INJECTION SUBCUTANEOUS at 12:41

## 2020-01-01 RX ADMIN — Medication 975 MILLIGRAM(S): at 23:56

## 2020-01-01 RX ADMIN — Medication 1: at 12:34

## 2020-01-01 RX ADMIN — TAMSULOSIN HYDROCHLORIDE 0.4 MILLIGRAM(S): 0.4 CAPSULE ORAL at 22:50

## 2020-01-01 RX ADMIN — HEPARIN SODIUM 5000 UNIT(S): 5000 INJECTION INTRAVENOUS; SUBCUTANEOUS at 13:24

## 2020-01-01 RX ADMIN — SODIUM CHLORIDE 60 MILLILITER(S): 9 INJECTION INTRAMUSCULAR; INTRAVENOUS; SUBCUTANEOUS at 16:08

## 2020-01-01 RX ADMIN — LISINOPRIL 10 MILLIGRAM(S): 2.5 TABLET ORAL at 04:38

## 2020-01-01 RX ADMIN — HEPARIN SODIUM 5000 UNIT(S): 5000 INJECTION INTRAVENOUS; SUBCUTANEOUS at 23:15

## 2020-01-01 RX ADMIN — HEPARIN SODIUM 5000 UNIT(S): 5000 INJECTION INTRAVENOUS; SUBCUTANEOUS at 13:48

## 2020-01-01 RX ADMIN — MORPHINE SULFATE 1 MILLIGRAM(S): 50 CAPSULE, EXTENDED RELEASE ORAL at 16:22

## 2020-01-01 RX ADMIN — PIPERACILLIN AND TAZOBACTAM 25 GRAM(S): 4; .5 INJECTION, POWDER, LYOPHILIZED, FOR SOLUTION INTRAVENOUS at 06:33

## 2020-01-01 RX ADMIN — Medication 2: at 18:26

## 2020-01-01 RX ADMIN — PIPERACILLIN AND TAZOBACTAM 25 GRAM(S): 4; .5 INJECTION, POWDER, LYOPHILIZED, FOR SOLUTION INTRAVENOUS at 13:07

## 2020-01-01 RX ADMIN — Medication 1: at 09:12

## 2020-01-01 RX ADMIN — HEPARIN SODIUM 5000 UNIT(S): 5000 INJECTION INTRAVENOUS; SUBCUTANEOUS at 04:46

## 2020-01-01 RX ADMIN — Medication 1: at 19:03

## 2020-01-01 RX ADMIN — HEPARIN SODIUM 5000 UNIT(S): 5000 INJECTION INTRAVENOUS; SUBCUTANEOUS at 04:44

## 2020-01-01 RX ADMIN — HEPARIN SODIUM 5000 UNIT(S): 5000 INJECTION INTRAVENOUS; SUBCUTANEOUS at 12:35

## 2020-01-01 RX ADMIN — Medication 2: at 12:40

## 2020-01-01 RX ADMIN — Medication 1: at 13:27

## 2020-01-01 RX ADMIN — HEPARIN SODIUM 5000 UNIT(S): 5000 INJECTION INTRAVENOUS; SUBCUTANEOUS at 12:53

## 2020-01-01 RX ADMIN — HEPARIN SODIUM 5000 UNIT(S): 5000 INJECTION INTRAVENOUS; SUBCUTANEOUS at 04:15

## 2020-01-01 RX ADMIN — HEPARIN SODIUM 5000 UNIT(S): 5000 INJECTION INTRAVENOUS; SUBCUTANEOUS at 23:11

## 2020-01-01 RX ADMIN — LISINOPRIL 10 MILLIGRAM(S): 2.5 TABLET ORAL at 05:41

## 2020-01-01 RX ADMIN — HEPARIN SODIUM 5000 UNIT(S): 5000 INJECTION INTRAVENOUS; SUBCUTANEOUS at 21:49

## 2020-01-01 RX ADMIN — ENOXAPARIN SODIUM 40 MILLIGRAM(S): 100 INJECTION SUBCUTANEOUS at 15:19

## 2020-04-01 NOTE — ED PROVIDER NOTE - OBJECTIVE STATEMENT
80 y/o M h/o HTN, DM p/w cough, fever x 4 days. Pt reports trouble breathing. Worse with ambulation or deep inspiration. Admits to dry cough. Wife currently admitted with COVID infection. O2 sat 92% on RA. Pt very tachypneic with ambulation, O2 sat 90% with ambulation. No abd pain, n/v/d, headache, dizziness. 78 y/o M h/o HTN, DM p/w cough, fever x 4 days. Pt reports trouble breathing. Worse with ambulation or deep inspiration. Admits to dry cough. Wife currently admitted with COVID infection. O2 sat 98% on RA. No abd pain, n/v/d, headache, dizziness.

## 2020-04-01 NOTE — ED PROVIDER NOTE - ATTENDING CONTRIBUTION TO CARE
79M dry cough fever cough sob.  O2 sat 98% RA.  LG temp.  Ambulating sat - aborted due to significant tachycardia and dizziness, near syncope.  Wife admitted for COVID.  Rx fluids, check labs, CXR, admit due to oxygen need in setting of likely COVID infection.    VS:  fever, tachycardia, mild hypoxia    GEN - malaise, mild resp distress   A+O x3   HEAD - NC/AT     ENT - PEERL, EOMI, mucous membranes dry , no discharge      NECK: Neck supple, non-tender without lymphadenopathy, no masses, no JVD  PULM - fine crackles bilat symmetric breath sounds  COR -  normal heart sounds    ABD - , ND, NT, soft,  BACK - no CVA tenderness, nontender spine     EXTREMS - no edema, no deformity, warm and well perfused    SKIN - no rash    or bruising      NEUROLOGIC - alert, face symmetric, speech fluent, sensation nl, motor no focal deficit.

## 2020-04-01 NOTE — ED PROVIDER NOTE - RESPIRATORY, MLM
Breath sounds clear. Tachypneic. 92% on RA. Breath sounds clear. Tachypneic. 98% on RA. Breathing comfortably on RA. NAD

## 2020-04-01 NOTE — ED PROVIDER NOTE - CLINICAL SUMMARY MEDICAL DECISION MAKING FREE TEXT BOX
likely COVID infection, given that wife is positive for COVID   plan  - labs  - covid swab  - cxr  - tylenol  - supplement oxygen  - reassess likely COVID infection, given that wife is positive for COVID. O2 sat 98%. Pt comfortable appearing. Will check amb sat.   plan  - labs  - covid swab  - cxr  - tylenol  - supplement oxygen  - reassess

## 2020-04-02 NOTE — H&P ADULT - PROBLEM SELECTOR PLAN 3
no baseline Cr.  elevated BUN/Cr. likely pre-renal in the clinical setting  s/p IV bolus in ED, will not give further IV hydration  monitor Cr. avoid nephrotoxics  monitor UOP.

## 2020-04-02 NOTE — H&P ADULT - NSHPPHYSICALEXAM_GEN_ALL_CORE
Vital Signs Last 24 Hrs  T(C): 36.9 (02 Apr 2020 13:00), Max: 39.6 (01 Apr 2020 23:57)  T(F): 98.4 (02 Apr 2020 13:00), Max: 103.3 (01 Apr 2020 23:57)  HR: 75 (02 Apr 2020 13:00) (73 - 131)  BP: 105/58 (02 Apr 2020 13:00) (11/57 - 142/76)  BP(mean): --  RR: 20 (02 Apr 2020 13:00) (18 - 97)  SpO2: 97% (02 Apr 2020 13:00) (92% - 100%)    patient on 2L NC, no distress. peaking in full sentences. Vital Signs Last 24 Hrs  T(C): 36.9 (02 Apr 2020 13:00), Max: 39.6 (01 Apr 2020 23:57)  T(F): 98.4 (02 Apr 2020 13:00), Max: 103.3 (01 Apr 2020 23:57)  HR: 75 (02 Apr 2020 13:00) (73 - 131)  BP: 105/58 (02 Apr 2020 13:00) (11/57 - 142/76)  BP(mean): --  RR: 20 (02 Apr 2020 13:00) (18 - 97)  SpO2: 97% (02 Apr 2020 13:00) (92% - 100%)    patient on RA, no distress. peaking in full sentences.

## 2020-04-02 NOTE — H&P ADULT - NSHPREVIEWOFSYSTEMS_GEN_ALL_CORE
CONSTITUTIONAL: +fever; No chills; No night sweats; No weight loss; +fatigue  EYES: No eye pain; No blurry vision  ENMT:  No difficulty hearing; No sinus or throat pain  NECK: No pain or stiffness  RESPIRATORY: +cough; No wheezing; No hemoptysis; +shortness of breath; No sputum production  CARDIOVASCULAR: No chest pain; No palpitations; No leg swelling  GASTROINTESTINAL: No abdominal pain; No nausea; No vomiting; No hematemesis; No diarrhea; No constipation. No melena or hematochezia.  GENITOURINARY: No dysuria; No frequency; No hematuria; No incontinence  NEUROLOGICAL: No headaches; No loss of strength; No numbness  SKIN: No itching/burning; No rashes or lesions   MUSCULOSKELETAL: No joint pain or swelling; No muscle, back, or extremity pain  HEME/LYMPH: No easy bruising, or bleeding gums

## 2020-04-02 NOTE — H&P ADULT - PROBLEM SELECTOR PLAN 4
Hold home oral hyperglycemic agents.   moderate ISS.  check HbA1c  Keep FS<180  DM diet not on any regimens at home  low ISS for now.   check HbA1c  Keep FS<180  DM diet

## 2020-04-02 NOTE — H&P ADULT - PROBLEM SELECTOR PLAN 5
BP low nl.   hold anti-hypertensives at this time.   restart home regimen when patient becoming hypertensive.  DASH diet

## 2020-04-02 NOTE — H&P ADULT - HISTORY OF PRESENT ILLNESS
78 y/o M h/o HTN, DM p/w cough, fever x 4 days. Pt reports trouble breathing. Worse with ambulation or deep inspiration. Admits to dry cough. Wife currently admitted with COVID infection. O2 sat 98% on RA. No abd pain, n/v/d, headache, dizziness.    In ED, patient febrile. subjective SOB on RA, sat 98% at rest. CXR with b/l opacity. COVID+. 78 y/o M h/o HTN, DM p/w cough, fever x 4 days. Pt reports trouble breathing. Worse with ambulation or deep inspiration. Admits to dry cough. Wife currently admitted with COVID infection. O2 sat 98% on RA. No abd pain, n/v/d, headache, dizziness.    In ED, patient febrile. subjective SOB on RA, sat 98% at rest. CXR with b/l opacity. COVID+. Patient seen on RA, sat 95%. no resp distress.

## 2020-04-02 NOTE — ED ADULT NURSE REASSESSMENT NOTE - NS ED NURSE REASSESS COMMENT FT1
pt in bed resting. nad. respirations equal and unlabored. no respiratory distress noted at the moment. Call bell in reach, warm blanket provided, bed in lowest position, side rails up x2,safety maintained. will continue to monitor.

## 2020-04-02 NOTE — H&P ADULT - PROBLEM SELECTOR PLAN 2
COVID+  supportive measures.  high risk given comorbidities.   currently on RA. requiring minimal supplemental o2.   if oxygenation worsen, will start Hydroxychloroquine, need EKG for baseline QTc  start Solumedrol if patient require NRB. COVID+  supportive measures.  high risk given comorbidities.   currently on RA. requiring minimal supplemental o2.   if oxygenation worsen, will start Hydroxychloroquine, need EKG for baseline QTc  start Solumedrol if patient require NRB.  check ambulatory O2.

## 2020-04-02 NOTE — H&P ADULT - PROBLEM SELECTOR PLAN 1
Fever with respiratory symptoms  CXR with b/l opacity  COVID19 PCR+  strict isolation precaution.   monitor respiratory status, currently on 2L NC for symptoms. sat 98% on RA.   O2 supplement PRN, titrate off as tolerated.   check ambulatory O2.   check baseline inflammatory labs: ESR, CRP, Ferritin, Procalcitonin, CPK, Trop, D-dimer  DVT ppx with HSQ given Be Fever with respiratory symptoms  CXR with b/l opacity  COVID19 PCR+  strict isolation precaution.   monitor respiratory status, currently on 2L NC for symptoms. sat 98% on RA.   O2 supplement PRN, titrate off as tolerated.   check ambulatory O2.   QT 380s on telemetry.   check baseline inflammatory labs: ESR, CRP, Ferritin, Procalcitonin, CPK, Trop, D-dimer  DVT ppx with HSQ given Be Fever with respiratory symptoms  CXR with b/l opacity  COVID19 PCR+  strict isolation precaution.   monitor respiratory status, currently on 2L NC for symptoms. sat 98% on RA.   O2 supplement PRN, titrate off as tolerated.   check ambulatory O2.   QT 390s, QTc 436 on telemetry.   check baseline inflammatory labs: ESR, CRP, Ferritin, Procalcitonin, CPK, Trop, D-dimer  DVT ppx with HSQ given Be

## 2020-04-03 NOTE — PROGRESS NOTE ADULT - PROBLEM SELECTOR PLAN 2
COVID+  supportive measures.  high risk given comorbidities.   currently on 2L n/c. requiring minimal supplemental o2.   if oxygenation worsen, will start Hydroxychloroquine, need EKG for baseline QTc  start Solumedrol if patient require NRB.  check ambulatory O2.

## 2020-04-03 NOTE — PROGRESS NOTE ADULT - PROBLEM SELECTOR PLAN 1
Fever with respiratory symptoms  CXR with b/l opacity  COVID19 PCR+  strict isolation precaution.   monitor respiratory status, currently on 2L NC for symptoms. sat 96% on 2L n/c.   O2  titrate off as tolerated.   check ambulatory O2.   QT 390s, QTc 436 on telemetry.   check baseline inflammatory labs: ESR, CRP, Ferritin, Procalcitonin, CPK, Trop, D-dimer  DVT ppx with HSQ given Be

## 2020-04-03 NOTE — PROGRESS NOTE ADULT - PROBLEM SELECTOR PLAN 3
no baseline Cr.  elevated BUN/Cr. likely pre-renal in the clinical setting  s/p IV bolus in ED, will not give further IV hydration-Improving.  monitor Cr. avoid nephrotoxics  monitor UOP.

## 2020-04-03 NOTE — PROGRESS NOTE ADULT - ASSESSMENT
79M with PMH DM, HTN presented with SOB, no hypoxia documented. CXR w/ b/l opacity, COVID+.    He has improved symptomatically and is saturating well on 2L n/c. Creatinine and slightly  dec to 1.4 and eGFR has Inc to 47.

## 2020-04-03 NOTE — PROGRESS NOTE ADULT - SUBJECTIVE AND OBJECTIVE BOX
Patient is a 79y old  Male who presents with a chief complaint of SOB (02 Apr 2020 14:58), fever, chills, headache for 3 days.     Has h/o HBP, HLD, DM,.     He was Positive for Covid19 as well as his wife.     He is feeling better. Has minimal cough and minimal diarrhea. Breathing is better. CXR is c/w with Covid with bnil patchy infiltrates.       SUBJECTIVE / OVERNIGHT EVENTS: No acute events overnight.    MEDICATIONS  (STANDING):  dextrose 5%. 1000 milliLiter(s) (50 mL/Hr) IV Continuous <Continuous>  dextrose 50% Injectable 12.5 Gram(s) IV Push once  dextrose 50% Injectable 25 Gram(s) IV Push once  dextrose 50% Injectable 25 Gram(s) IV Push once  heparin  Injectable 5000 Unit(s) SubCutaneous every 8 hours  insulin lispro (HumaLOG) corrective regimen sliding scale   SubCutaneous three times a day before meals  insulin lispro (HumaLOG) corrective regimen sliding scale   SubCutaneous at bedtime    MEDICATIONS  (PRN):  acetaminophen   Tablet .. 650 milliGRAM(s) Oral every 4 hours PRN Temp greater or equal to 38.5C (101.3F)  acetaminophen  Suppository .. 650 milliGRAM(s) Rectal every 4 hours PRN Temp greater or equal to 38.5C (101.3F)  benzonatate 100 milliGRAM(s) Oral three times a day PRN Cough  dextrose 40% Gel 15 Gram(s) Oral once PRN Blood Glucose LESS THAN 70 milliGRAM(s)/deciliter  glucagon  Injectable 1 milliGRAM(s) IntraMuscular once PRN Glucose LESS THAN 70 milligrams/deciliter      T(C): 37 (04-03-20 @ 14:18), Max: 37.7 (04-02-20 @ 20:16)  HR: 83 (04-03-20 @ 14:18) (80 - 83)  BP: 121/72 (04-03-20 @ 14:18) (121/72 - 126/73)  RR: 20 (04-03-20 @ 14:18) (20 - 20)  SpO2: 96% (04-03-20 @ 14:18) (95% - 96%)  CAPILLARY BLOOD GLUCOSE      POCT Blood Glucose.: 187 mg/dL (03 Apr 2020 12:19)  POCT Blood Glucose.: 121 mg/dL (02 Apr 2020 22:10)  POCT Blood Glucose.: 96 mg/dL (02 Apr 2020 17:01)    I&O's Summary    02 Apr 2020 07:01  -  03 Apr 2020 07:00  --------------------------------------------------------  IN: 0 mL / OUT: 1050 mL / NET: -1050 mL        PHYSICAL EXAM:  GENERAL: not in distress, on 2L n/c  EYES: open, sclera clear b/l  CHEST/LUNG: normal respiratory effort, not tachypneic, speaking in full sentences without difficulty  HEART: Not tachycardic, no lower extremity edema b/l  ABDOMEN: Soft, Nontender, Nondistended  EXTREMITIES: Warm and well perfused  NEUROLOGY: awake, alert, responds to Qs appropriately, no gross deficits  PSYCH: calm, cooperative  SKIN: No visible rashes or lesions    LABS:                        13.8   5.64  )-----------( 195      ( 03 Apr 2020 07:00 )             39.9     04-03    133<L>  |  99  |  32<H>  ----------------------------<  128<H>  4.1   |  22  |  1.40<H>    Ca    10.0      03 Apr 2020 07:00    TPro  8.0  /  Alb  3.9  /  TBili  0.6  /  DBili  x   /  AST  41<H>  /  ALT  25  /  AlkPhos  70  04-03              RADIOLOGY & ADDITIONAL TESTS:

## 2020-04-04 NOTE — PROGRESS NOTE ADULT - SUBJECTIVE AND OBJECTIVE BOX
Patient is a 79y old  Male who presents with a chief complaint of SOB (03 Apr 2020 15:37).    Feels OK. Breathing is OK with min cough. O2 93% with 2L n/c. No other complaints.      SUBJECTIVE / OVERNIGHT EVENTS: No acute events overnight.    MEDICATIONS  (STANDING):  dextrose 5%. 1000 milliLiter(s) (50 mL/Hr) IV Continuous <Continuous>  dextrose 50% Injectable 12.5 Gram(s) IV Push once  dextrose 50% Injectable 25 Gram(s) IV Push once  dextrose 50% Injectable 25 Gram(s) IV Push once  heparin  Injectable 5000 Unit(s) SubCutaneous every 8 hours  insulin lispro (HumaLOG) corrective regimen sliding scale   SubCutaneous three times a day before meals  insulin lispro (HumaLOG) corrective regimen sliding scale   SubCutaneous at bedtime    MEDICATIONS  (PRN):  acetaminophen   Tablet .. 650 milliGRAM(s) Oral every 4 hours PRN Temp greater or equal to 38.5C (101.3F)  acetaminophen  Suppository .. 650 milliGRAM(s) Rectal every 4 hours PRN Temp greater or equal to 38.5C (101.3F)  benzonatate 100 milliGRAM(s) Oral three times a day PRN Cough  dextrose 40% Gel 15 Gram(s) Oral once PRN Blood Glucose LESS THAN 70 milliGRAM(s)/deciliter  glucagon  Injectable 1 milliGRAM(s) IntraMuscular once PRN Glucose LESS THAN 70 milligrams/deciliter      T(C): 37.8 (04-04-20 @ 08:52), Max: 37.8 (04-04-20 @ 08:52)  HR: 99 (04-04-20 @ 08:52) (99 - 100)  BP: 106/62 (04-04-20 @ 13:22) (106/62 - 108/61)  RR: 19 (04-04-20 @ 08:52) (19 - 20)  SpO2: 93% (04-04-20 @ 08:52) (93% - 95%)  CAPILLARY BLOOD GLUCOSE      POCT Blood Glucose.: 142 mg/dL (04 Apr 2020 13:05)  POCT Blood Glucose.: 125 mg/dL (04 Apr 2020 08:51)  POCT Blood Glucose.: 127 mg/dL (03 Apr 2020 23:48)  POCT Blood Glucose.: 128 mg/dL (03 Apr 2020 17:31)    I&O's Summary    04 Apr 2020 07:01  -  04 Apr 2020 15:46  --------------------------------------------------------  IN: 220 mL / OUT: 250 mL / NET: -30 mL        PHYSICAL EXAM:  GENERAL: not in distress, on n/c.  EYES: open, sclera clear b/l  CHEST/LUNG: normal respiratory effort, not tachypneic, speaking in full sentences without difficulty. Few eduardo scattered crackles.   HEART: Not tachycardic, no lower extremity edema b/l  ABDOMEN: Soft, Nontender, Nondistended  EXTREMITIES: Warm and well perfused  NEUROLOGY: awake, alert, responds to Qs appropriately, no gross deficits  PSYCH: calm, cooperative  SKIN: No visible rashes or lesions    LABS:                        13.7   6.19  )-----------( 200      ( 04 Apr 2020 05:59 )             39.6     04-04    137  |  101  |  41<H>  ----------------------------<  123<H>  4.4   |  22  |  1.63<H>    Ca    10.2      04 Apr 2020 06:00  Phos  2.7     04-04  Mg     1.9     04-04    TPro  8.1  /  Alb  3.9  /  TBili  0.6  /  DBili  x   /  AST  47<H>  /  ALT  35  /  AlkPhos  66  04-04              RADIOLOGY & ADDITIONAL TESTS:

## 2020-04-04 NOTE — PROGRESS NOTE ADULT - ASSESSMENT
79M with PMH DM, HTN presented with SOB, no hypoxia documented. CXR w/ b/l opacity, COVID+.    He has improved symptomatically and is saturating well on 2L n/c. Creatinine and slightly  inc back  to 1.6 and eGFR has dec to 39.

## 2020-04-04 NOTE — PROGRESS NOTE ADULT - PROBLEM SELECTOR PLAN 3
no baseline Cr.  elevated BUN/Cr. likely pre-renal in the clinical setting  s/p IV bolus in ED, will  give further IV hydration- owing to inc Cr and dec eGFR- probably due to poor intake..  monitor Cr. avoid nephrotoxics  monitor UOP. He is in 1L neg balance.

## 2020-04-05 NOTE — PROGRESS NOTE ADULT - PROBLEM SELECTOR PLAN 2
COVID+  supportive measures.  high risk given comorbidities. currently on RA and doing well.   check ambulatory O2. If he maintains saturation, then he can be d/silverio.

## 2020-04-05 NOTE — PROGRESS NOTE ADULT - SUBJECTIVE AND OBJECTIVE BOX
Patient is a 79y old  Male who presents with a chief complaint of SOB (04 Apr 2020 15:46).    He is doing well without supplemental O2. Was put on RA this a.m. RR is 17 with O2 sat of 95%. He feels OK> No SOB , still has mild cough. Blood sugar is in range.       SUBJECTIVE / OVERNIGHT EVENTS: No acute events overnight.    MEDICATIONS  (STANDING):  dextrose 5%. 1000 milliLiter(s) (50 mL/Hr) IV Continuous <Continuous>  dextrose 50% Injectable 12.5 Gram(s) IV Push once  dextrose 50% Injectable 25 Gram(s) IV Push once  dextrose 50% Injectable 25 Gram(s) IV Push once  heparin  Injectable 5000 Unit(s) SubCutaneous every 8 hours  insulin lispro (HumaLOG) corrective regimen sliding scale   SubCutaneous three times a day before meals  insulin lispro (HumaLOG) corrective regimen sliding scale   SubCutaneous at bedtime  sodium chloride 0.9%. 1000 milliLiter(s) (60 mL/Hr) IV Continuous <Continuous>    MEDICATIONS  (PRN):  acetaminophen   Tablet .. 650 milliGRAM(s) Oral every 4 hours PRN Temp greater or equal to 38.5C (101.3F)  acetaminophen  Suppository .. 650 milliGRAM(s) Rectal every 4 hours PRN Temp greater or equal to 38.5C (101.3F)  benzonatate 100 milliGRAM(s) Oral three times a day PRN Cough  dextrose 40% Gel 15 Gram(s) Oral once PRN Blood Glucose LESS THAN 70 milliGRAM(s)/deciliter  glucagon  Injectable 1 milliGRAM(s) IntraMuscular once PRN Glucose LESS THAN 70 milligrams/deciliter      T(C): 36.8 (04-05-20 @ 11:16), Max: 38.2 (04-04-20 @ 23:16)  HR: 78 (04-05-20 @ 11:16) (78 - 97)  BP: 122/63 (04-05-20 @ 11:16) (106/62 - 129/69)  RR: 17 (04-05-20 @ 11:16) (17 - 18)  SpO2: 95% (04-05-20 @ 11:16) (92% - 95%)  CAPILLARY BLOOD GLUCOSE      POCT Blood Glucose.: 161 mg/dL (05 Apr 2020 12:28)  POCT Blood Glucose.: 116 mg/dL (05 Apr 2020 08:31)  POCT Blood Glucose.: 152 mg/dL (04 Apr 2020 21:23)  POCT Blood Glucose.: 119 mg/dL (04 Apr 2020 18:14)    I&O's Summary    04 Apr 2020 07:01  -  05 Apr 2020 07:00  --------------------------------------------------------  IN: 880 mL / OUT: 850 mL / NET: 30 mL    05 Apr 2020 07:01  -  05 Apr 2020 13:11  --------------------------------------------------------  IN: 600 mL / OUT: 700 mL / NET: -100 mL        PHYSICAL EXAM:  GENERAL: not in distress, on room air  EYES: open, sclera clear b/l  CHEST/LUNG: normal respiratory effort, not tachypneic, speaking in full sentences without difficulty  HEART: Not tachycardic, no lower extremity edema b/l  ABDOMEN: Soft, Nontender, Nondistended  EXTREMITIES: Warm and well perfused  NEUROLOGY: awake, alert, responds to Qs appropriately, no gross deficits  PSYCH: calm, cooperative  SKIN: No visible rashes or lesions    LABS:                        13.7   5.68  )-----------( 193      ( 05 Apr 2020 06:40 )             39.9     04-05    142  |  104  |  37<H>  ----------------------------<  109<H>  4.8   |  22  |  1.46<H>    Ca    9.8      05 Apr 2020 06:40  Phos  2.7     04-05  Mg     2.0     04-05    TPro  7.5  /  Alb  3.9  /  TBili  0.7  /  DBili  x   /  AST  60<H>  /  ALT  43<H>  /  AlkPhos  65  04-05              RADIOLOGY & ADDITIONAL TESTS:

## 2020-04-05 NOTE — PROGRESS NOTE ADULT - ASSESSMENT
79M with PMH DM, HTN presented with SOB, no hypoxia documented. CXR w/ b/l opacity, COVID+.    He has improved symptomatically and is saturating well on 2L n/c. Creatinine and slightly  inc back  to 1.6 and eGFR has dec to 39.    4-5-20:    Presently doing well at rest on RA. BUN/Cr stable at 37/1.46 and eGFR inc to 45.     If on ambulation he maintains O2 sat>94%, then consider d/c. Spoke with ACP.

## 2020-04-05 NOTE — PROGRESS NOTE ADULT - PROBLEM SELECTOR PLAN 1
Fever with respiratory symptoms  CXR with b/l opacity  COVID19 PCR+  strict isolation precaution.   monitor respiratory status, currently on RA for symptoms. sat 95% on 2L n/c.   O2  titrate off as tolerated.   check ambulatory O2.   QT 390s, QTc 436 on telemetry.   check baseline inflammatory labs: ESR, CRP, Ferritin, Procalcitonin, CPK, Trop, D-dimer  DVT ppx with HSQ given Be

## 2020-04-06 NOTE — PROGRESS NOTE ADULT - SUBJECTIVE AND OBJECTIVE BOX
[facility] Division of Hospital Medicine  Nelson Warner MD  Pager [phone]    Patient is a 79y old  Male who presents with a chief complaint of SOB (05 Apr 2020 13:11)      SUBJECTIVE / OVERNIGHT EVENTS: Feeling better      MEDICATIONS  (STANDING):  dextrose 5%. 1000 milliLiter(s) (50 mL/Hr) IV Continuous <Continuous>  dextrose 50% Injectable 12.5 Gram(s) IV Push once  dextrose 50% Injectable 25 Gram(s) IV Push once  dextrose 50% Injectable 25 Gram(s) IV Push once  heparin  Injectable 5000 Unit(s) SubCutaneous every 8 hours  insulin lispro (HumaLOG) corrective regimen sliding scale   SubCutaneous three times a day before meals  insulin lispro (HumaLOG) corrective regimen sliding scale   SubCutaneous at bedtime  sodium chloride 0.9%. 1000 milliLiter(s) (60 mL/Hr) IV Continuous <Continuous>    MEDICATIONS  (PRN):  acetaminophen   Tablet .. 650 milliGRAM(s) Oral every 4 hours PRN Temp greater or equal to 38.5C (101.3F)  acetaminophen  Suppository .. 650 milliGRAM(s) Rectal every 4 hours PRN Temp greater or equal to 38.5C (101.3F)  benzonatate 100 milliGRAM(s) Oral three times a day PRN Cough  dextrose 40% Gel 15 Gram(s) Oral once PRN Blood Glucose LESS THAN 70 milliGRAM(s)/deciliter  glucagon  Injectable 1 milliGRAM(s) IntraMuscular once PRN Glucose LESS THAN 70 milligrams/deciliter      CAPILLARY BLOOD GLUCOSE      POCT Blood Glucose.: 170 mg/dL (06 Apr 2020 12:38)  POCT Blood Glucose.: 175 mg/dL (06 Apr 2020 12:32)  POCT Blood Glucose.: 115 mg/dL (06 Apr 2020 09:02)  POCT Blood Glucose.: 112 mg/dL (06 Apr 2020 08:18)  POCT Blood Glucose.: 144 mg/dL (06 Apr 2020 00:18)  POCT Blood Glucose.: 132 mg/dL (05 Apr 2020 17:43)    I&O's Summary    05 Apr 2020 07:01  -  06 Apr 2020 07:00  --------------------------------------------------------  IN: 940 mL / OUT: 950 mL / NET: -10 mL    06 Apr 2020 07:01  -  06 Apr 2020 15:07  --------------------------------------------------------  IN: 0 mL / OUT: 500 mL / NET: -500 mL        PHYSICAL EXAM:  Vital Signs Last 24 Hrs  T(C): 37 (06 Apr 2020 00:14), Max: 37 (06 Apr 2020 00:14)  T(F): 98.6 (06 Apr 2020 00:14), Max: 98.6 (06 Apr 2020 00:14)  HR: 84 (06 Apr 2020 00:14) (84 - 84)  BP: 113/64 (06 Apr 2020 00:14) (113/64 - 113/64)  BP(mean): --  RR: 18 (06 Apr 2020 00:14) (18 - 24)  SpO2: 92% (06 Apr 2020 00:14) (84% - 92%)  CONSTITUTIONAL: NAD  EYES: EOMI, conjunctiva and sclera clear  ENMT: Moist oral mucosa  NECK: Supple  RESPIRATORY: Breathing unlabored  CARDIOVASCULAR: not auscultated  ABDOMEN: Nondistended  MUSCULOSKELETAL: no clubbing or cyanosis of digits  NEUROLOGY: No focal deficits   SKIN: No rashes or lesions    LABS:                        12.7   5.66  )-----------( 206      ( 06 Apr 2020 06:32 )             36.9     04-06    142  |  106  |  34<H>  ----------------------------<  122<H>  4.6   |  21<L>  |  1.33<H>    Ca    10.0      06 Apr 2020 06:32  Phos  2.7     04-05  Mg     2.0     04-06    TPro  7.7  /  Alb  3.6  /  TBili  0.6  /  DBili  x   /  AST  60<H>  /  ALT  42<H>  /  AlkPhos  62  04-06                RADIOLOGY & ADDITIONAL TESTS:  Results Reviewed:   Imaging Personally Reviewed:  Electrocardiogram Personally Reviewed:    COORDINATION OF CARE:  Care Discussed with Consultants/Other Providers [Y/N]:  Prior or Outpatient Records Reviewed [Y/N]:    CODE: FULL [facility] Division of Hospital Medicine  Nelson Warner MD  Pager [phone]    Patient is a 79y old  Male who presents with a chief complaint of SOB (05 Apr 2020 13:11)      SUBJECTIVE / OVERNIGHT EVENTS: Feeling better      MEDICATIONS  (STANDING):  dextrose 5%. 1000 milliLiter(s) (50 mL/Hr) IV Continuous <Continuous>  dextrose 50% Injectable 12.5 Gram(s) IV Push once  dextrose 50% Injectable 25 Gram(s) IV Push once  dextrose 50% Injectable 25 Gram(s) IV Push once  heparin  Injectable 5000 Unit(s) SubCutaneous every 8 hours  insulin lispro (HumaLOG) corrective regimen sliding scale   SubCutaneous three times a day before meals  insulin lispro (HumaLOG) corrective regimen sliding scale   SubCutaneous at bedtime  sodium chloride 0.9%. 1000 milliLiter(s) (60 mL/Hr) IV Continuous <Continuous>    MEDICATIONS  (PRN):  acetaminophen   Tablet .. 650 milliGRAM(s) Oral every 4 hours PRN Temp greater or equal to 38.5C (101.3F)  acetaminophen  Suppository .. 650 milliGRAM(s) Rectal every 4 hours PRN Temp greater or equal to 38.5C (101.3F)  benzonatate 100 milliGRAM(s) Oral three times a day PRN Cough  dextrose 40% Gel 15 Gram(s) Oral once PRN Blood Glucose LESS THAN 70 milliGRAM(s)/deciliter  glucagon  Injectable 1 milliGRAM(s) IntraMuscular once PRN Glucose LESS THAN 70 milligrams/deciliter      CAPILLARY BLOOD GLUCOSE      POCT Blood Glucose.: 170 mg/dL (06 Apr 2020 12:38)  POCT Blood Glucose.: 175 mg/dL (06 Apr 2020 12:32)  POCT Blood Glucose.: 115 mg/dL (06 Apr 2020 09:02)  POCT Blood Glucose.: 112 mg/dL (06 Apr 2020 08:18)  POCT Blood Glucose.: 144 mg/dL (06 Apr 2020 00:18)  POCT Blood Glucose.: 132 mg/dL (05 Apr 2020 17:43)    I&O's Summary    05 Apr 2020 07:01  -  06 Apr 2020 07:00  --------------------------------------------------------  IN: 940 mL / OUT: 950 mL / NET: -10 mL    06 Apr 2020 07:01  -  06 Apr 2020 15:07  --------------------------------------------------------  IN: 0 mL / OUT: 500 mL / NET: -500 mL        PHYSICAL EXAM:  Vital Signs Last 24 Hrs  T(C): 37 (06 Apr 2020 00:14), Max: 37 (06 Apr 2020 00:14)  T(F): 98.6 (06 Apr 2020 00:14), Max: 98.6 (06 Apr 2020 00:14)  HR: 84 (06 Apr 2020 00:14) (84 - 84)  BP: 113/64 (06 Apr 2020 00:14) (113/64 - 113/64)  BP(mean): --  RR: 18 (06 Apr 2020 00:14) (18 - 24)  SpO2: 92% (06 Apr 2020 00:14) (84% - 92%)  CONSTITUTIONAL: NAD  EYES: EOMI, conjunctiva and sclera clear  ENMT: Moist oral mucosa  NECK: Supple  RESPIRATORY: Breathing unlabored  CARDIOVASCULAR: not auscultated  ABDOMEN: Nondistended  MUSCULOSKELETAL: no clubbing or cyanosis of digits  NEUROLOGY: No focal deficits   SKIN: No rashes or lesions    LABS:                        12.7   5.66  )-----------( 206      ( 06 Apr 2020 06:32 )             36.9     04-06    142  |  106  |  34<H>  ----------------------------<  122<H>  4.6   |  21<L>  |  1.33<H>    Ca    10.0      06 Apr 2020 06:32  Phos  2.7     04-05  Mg     2.0     04-06    TPro  7.7  /  Alb  3.6  /  TBili  0.6  /  DBili  x   /  AST  60<H>  /  ALT  42<H>  /  AlkPhos  62  04-06          CODE: FULL

## 2020-04-06 NOTE — PROGRESS NOTE ADULT - PROBLEM SELECTOR PLAN 3
no baseline Cr.  elevated BUN/Cr. likely pre-renal in the clinical setting  s/p IV bolus in ED, will  give further IV hydration- owing to inc Cr and dec eGFR- probably due to poor intake..  monitor Cr. avoid nephrotoxics  monitor UOP. He is in 1L neg balance. not on any regimens at home  low ISS for now.   check HbA1c- Now 7.0  Keep FS<180  DM diet

## 2020-04-06 NOTE — PROGRESS NOTE ADULT - ASSESSMENT
79M with PMH DM, HTN presented with SOB, no hypoxia documented. CXR w/ b/l opacity, COVID+.    He has improved symptomatically and is saturating well on 2L n/c. Creatinine and slightly  inc back  to 1.6 and eGFR has dec to 39.    4-5-20:    Presently doing well at rest on RA. BUN/Cr stable at 37/1.46 and eGFR inc to 45.     If on ambulation he maintains O2 sat>94%, then consider d/c. Spoke with ACP. 79M with PMH DM, HTN presented with SOB, CXR w/ b/l opacity, COVID+.

## 2020-04-06 NOTE — DISCHARGE NOTE PROVIDER - HOSPITAL COURSE
79M with PMH DM, HTN presented with SOB, CXR w/ b/l opacity, COVID+.                Problem/Plan - 1:    ·  Problem: SOB (shortness of breath) on exertion.  Plan: Fever with respiratory symptoms    CXR with b/l opacity    COVID19 PCR+    strict isolation precaution.     monitor respiratory status, currently on RA for symptoms.      O2  titrate off as tolerated.     check ambulatory O2.     if stable on RA may discharge today    d/c time 35 min coordinating care.            Problem/Plan - 2:    ·  Problem: SELINA (acute kidney injury). Plan: Improving likely prerenal.            Problem/Plan - 3:    ·  Problem: Type 2 diabetes mellitus without complication, without long-term current use of insulin. Plan: not on any regimens at home    low ISS for now.     check HbA1c- Now 7.0    Keep FS<180    DM diet.            Problem/Plan - 4:    ·  Problem: Essential hypertension. Plan: BP low nl.     hold anti-hypertensives at this time.     restart home regimen when patient becoming hypertensive.--hold on D/c    DASH diet. discussed case with         Dr. Warner today. Patient is cleared for discharge as his oxygenation has improved. Patient is not requiring supplemental oxygen at this time.

## 2020-04-06 NOTE — PROGRESS NOTE ADULT - PROBLEM SELECTOR PLAN 1
Fever with respiratory symptoms  CXR with b/l opacity  COVID19 PCR+  strict isolation precaution.   monitor respiratory status, currently on RA for symptoms. sat 95% on 2L n/c.   O2  titrate off as tolerated.   check ambulatory O2.   QT 390s, QTc 436 on telemetry.   check baseline inflammatory labs: ESR, CRP, Ferritin, Procalcitonin, CPK, Trop, D-dimer  DVT ppx with HSQ given Be Fever with respiratory symptoms  CXR with b/l opacity  COVID19 PCR+  strict isolation precaution.   monitor respiratory status, currently on RA for symptoms.    O2  titrate off as tolerated.   check ambulatory O2.   if stable on RA may discharge today  d/c time 35 min coordinating care

## 2020-04-06 NOTE — PROGRESS NOTE ADULT - PROBLEM SELECTOR PLAN 4
not on any regimens at home  low ISS for now.   check HbA1c- Now 7.0  Keep FS<180  DM diet BP low nl.   hold anti-hypertensives at this time.   restart home regimen when patient becoming hypertensive.--hold on D/c  DASH diet

## 2020-04-06 NOTE — PROGRESS NOTE ADULT - PROBLEM SELECTOR PLAN 2
COVID+  supportive measures.  high risk given comorbidities. currently on RA and doing well.   check ambulatory O2. If he maintains saturation, then he can be d/silverio. Improving likely prerenal

## 2020-04-06 NOTE — DISCHARGE NOTE PROVIDER - NSDCMRMEDTOKEN_GEN_ALL_CORE_FT
acetaminophen 500 mg oral tablet: 1-2 tab(s) orally every 6 hours as needed for pain and fever  Flomax 0.4 mg oral capsule: 1 cap(s) orally once a day  hydroCHLOROthiazide 25 mg oral tablet: 1 tab(s) orally once a day  lisinopril 10 mg oral tablet: 1 tab(s) orally once a day

## 2020-04-07 NOTE — PROGRESS NOTE ADULT - SUBJECTIVE AND OBJECTIVE BOX
[facility] Division of Hospital Medicine  Nelson Warner MD  Pager [phone]    Patient is a 79y old  Male who presents with a chief complaint of SOB (06 Apr 2020 15:19)      SUBJECTIVE / OVERNIGHT EVENTS: Feeling better      MEDICATIONS  (STANDING):  dextrose 5%. 1000 milliLiter(s) (50 mL/Hr) IV Continuous <Continuous>  dextrose 50% Injectable 12.5 Gram(s) IV Push once  dextrose 50% Injectable 25 Gram(s) IV Push once  dextrose 50% Injectable 25 Gram(s) IV Push once  enoxaparin Injectable 40 milliGRAM(s) SubCutaneous daily  insulin lispro (HumaLOG) corrective regimen sliding scale   SubCutaneous three times a day before meals  insulin lispro (HumaLOG) corrective regimen sliding scale   SubCutaneous at bedtime  lisinopril 10 milliGRAM(s) Oral daily  sodium chloride 0.9%. 1000 milliLiter(s) (60 mL/Hr) IV Continuous <Continuous>  tamsulosin 0.4 milliGRAM(s) Oral at bedtime    MEDICATIONS  (PRN):  acetaminophen   Tablet .. 650 milliGRAM(s) Oral every 4 hours PRN Temp greater or equal to 38.5C (101.3F)  acetaminophen  Suppository .. 650 milliGRAM(s) Rectal every 4 hours PRN Temp greater or equal to 38.5C (101.3F)  benzonatate 100 milliGRAM(s) Oral three times a day PRN Cough  dextrose 40% Gel 15 Gram(s) Oral once PRN Blood Glucose LESS THAN 70 milliGRAM(s)/deciliter  glucagon  Injectable 1 milliGRAM(s) IntraMuscular once PRN Glucose LESS THAN 70 milligrams/deciliter      CAPILLARY BLOOD GLUCOSE      POCT Blood Glucose.: 172 mg/dL (07 Apr 2020 12:37)  POCT Blood Glucose.: 135 mg/dL (07 Apr 2020 08:47)  POCT Blood Glucose.: 153 mg/dL (06 Apr 2020 20:38)  POCT Blood Glucose.: 146 mg/dL (06 Apr 2020 16:24)    I&O's Summary    06 Apr 2020 07:01  -  07 Apr 2020 07:00  --------------------------------------------------------  IN: 0 mL / OUT: 500 mL / NET: -500 mL        PHYSICAL EXAM:  Vital Signs Last 24 Hrs  T(C): 36.6 (07 Apr 2020 05:26), Max: 36.9 (06 Apr 2020 17:22)  T(F): 97.8 (07 Apr 2020 05:26), Max: 98.5 (06 Apr 2020 17:22)  HR: 106 (07 Apr 2020 05:26) (105 - 106)  BP: 132/67 (07 Apr 2020 05:26) (129/62 - 132/67)  BP(mean): --  RR: 18 (07 Apr 2020 08:24) (18 - 24)  SpO2: 87% (07 Apr 2020 08:24) (86% - 90%)  CONSTITUTIONAL: NAD  EYES: EOMI, conjunctiva and sclera clear  ENMT: Moist oral mucosa  NECK: Supple  RESPIRATORY: Breathing unlabored  CARDIOVASCULAR: not auscultated  ABDOMEN: Nondistended  MUSCULOSKELETAL: no clubbing or cyanosis of digits  NEUROLOGY: No focal deficits   SKIN: No rashes or lesions    LABS:                        12.0   9.59  )-----------( 185      ( 07 Apr 2020 07:00 )             36.4     04-07    141  |  106  |  40<H>  ----------------------------<  146<H>  4.5   |  20<L>  |  1.41<H>    Ca    9.7      07 Apr 2020 07:00  Mg     2.0     04-06    TPro  7.3  /  Alb  3.2<L>  /  TBili  0.9  /  DBili  x   /  AST  54<H>  /  ALT  34  /  AlkPhos  60  04-07      CODE: FULL

## 2020-04-07 NOTE — PROGRESS NOTE ADULT - PROBLEM SELECTOR PLAN 4
BP low nl.   hold anti-hypertensives at this time.   restart home regimen when patient becoming hypertensive.--hold on D/c  DASH diet

## 2020-04-07 NOTE — PROGRESS NOTE ADULT - PROBLEM SELECTOR PLAN 1
Fever with respiratory symptoms  CXR with b/l opacity  COVID19 PCR+  strict isolation precaution.   monitor respiratory status, currently on RA for symptoms.    O2  titrate off as tolerated.   check ambulatory O2.   if stable on RA may discharge

## 2020-04-08 NOTE — PROGRESS NOTE ADULT - SUBJECTIVE AND OBJECTIVE BOX
[facility] Division of Hospital Medicine  Nelson Warner MD  Pager [phone]    Patient is a 79y old  Male who presents with a chief complaint of SOB (07 Apr 2020 15:23)      SUBJECTIVE / OVERNIGHT EVENTS: Had been improving but now worsening hypoxia, tachypnea, on NRB      MEDICATIONS  (STANDING):  dextrose 5%. 1000 milliLiter(s) (50 mL/Hr) IV Continuous <Continuous>  dextrose 50% Injectable 12.5 Gram(s) IV Push once  dextrose 50% Injectable 25 Gram(s) IV Push once  dextrose 50% Injectable 25 Gram(s) IV Push once  enoxaparin Injectable 40 milliGRAM(s) SubCutaneous daily  insulin lispro (HumaLOG) corrective regimen sliding scale   SubCutaneous three times a day before meals  insulin lispro (HumaLOG) corrective regimen sliding scale   SubCutaneous at bedtime  lisinopril 10 milliGRAM(s) Oral daily  tamsulosin 0.4 milliGRAM(s) Oral at bedtime    MEDICATIONS  (PRN):  acetaminophen   Tablet .. 650 milliGRAM(s) Oral every 4 hours PRN Temp greater or equal to 38.5C (101.3F)  acetaminophen  Suppository .. 650 milliGRAM(s) Rectal every 4 hours PRN Temp greater or equal to 38.5C (101.3F)  benzonatate 100 milliGRAM(s) Oral three times a day PRN Cough  dextrose 40% Gel 15 Gram(s) Oral once PRN Blood Glucose LESS THAN 70 milliGRAM(s)/deciliter  glucagon  Injectable 1 milliGRAM(s) IntraMuscular once PRN Glucose LESS THAN 70 milligrams/deciliter      CAPILLARY BLOOD GLUCOSE      POCT Blood Glucose.: 216 mg/dL (08 Apr 2020 11:55)  POCT Blood Glucose.: 199 mg/dL (08 Apr 2020 08:08)  POCT Blood Glucose.: 194 mg/dL (07 Apr 2020 21:52)  POCT Blood Glucose.: 181 mg/dL (07 Apr 2020 18:39)  POCT Blood Glucose.: 187 mg/dL (07 Apr 2020 17:03)    I&O's Summary      PHYSICAL EXAM:  Vital Signs Last 24 Hrs  T(C): 36.5 (08 Apr 2020 11:00), Max: 36.8 (08 Apr 2020 04:38)  T(F): 97.7 (08 Apr 2020 11:00), Max: 98.3 (08 Apr 2020 04:38)  HR: 115 (08 Apr 2020 11:00) (105 - 115)  BP: 144/75 (08 Apr 2020 11:00) (131/58 - 144/75)  BP(mean): --  RR: 32 (08 Apr 2020 11:00) (18 - 32)  SpO2: 94% (08 Apr 2020 11:06) (78% - 94%)  CONSTITUTIONAL: NAD  EYES: EOMI, conjunctiva and sclera clear  ENMT: Moist oral mucosa  NECK: Supple  RESPIRATORY: Breathing unlabored  CARDIOVASCULAR: not auscultated  ABDOMEN: Nondistended  MUSCULOSKELETAL: no clubbing or cyanosis of digits  NEUROLOGY: No focal deficits   SKIN: No rashes or lesions    LABS:                        12.2   16.60 )-----------( 221      ( 08 Apr 2020 06:00 )             36.9     04-08    147<H>  |  114<H>  |  40<H>  ----------------------------<  211<H>  4.8   |  20<L>  |  1.39<H>    Ca    9.9      08 Apr 2020 06:00  Phos  3.1     04-08  Mg     2.2     04-08    TPro  7.3  /  Alb  3.2<L>  /  TBili  0.9  /  DBili  x   /  AST  54<H>  /  ALT  34  /  AlkPhos  60  04-07          CODE: FULL

## 2020-04-08 NOTE — PROGRESS NOTE ADULT - PROBLEM SELECTOR PLAN 1
Fever with respiratory symptoms  CXR with b/l opacity  COVID19 PCR+  strict isolation precaution  Now increased O2 requirements, increasing D-dimer, procalcitonin, ferritin, leukocytosis, tachycardia.  Concern for PE vs bacterial superinfection vs worsening COVID. COVID sx started 4 days PTA/11 days ago, worsening of COVID-19 disease seems less likely  Check LE dopplers, CXR  Will start therapeutic lovenox for empiric treatment of PE  IV antibiotics of CXR concerning for batcerial pneumonia  Attempted to update daughter by phone on 4/8, no answer Fever with respiratory symptoms  CXR with b/l opacity  COVID19 PCR+  strict isolation precaution  Now increased O2 requirements, increasing D-dimer, procalcitonin, ferritin, leukocytosis, tachycardia.  Concern for PE vs bacterial superinfection vs worsening COVID. COVID sx started 4 days PTA/11 days ago, worsening of COVID-19 disease seems less likely  Check LE dopplers, CXR  Will start therapeutic lovenox for empiric treatment of PE  CXR concerning for bacterial superinfection  Discussed with patient's granddaughter, family does not want plaquenil, wants trial of intubation if required if expected that pt can recover from bacterial pnuemonia and/or PE

## 2020-04-09 NOTE — PROGRESS NOTE ADULT - PROBLEM SELECTOR PLAN 1
Fever with respiratory symptoms  CXR with b/l opacity  COVID19 PCR+  strict isolation precaution  Now increased O2 requirements, increasing D-dimer, procalcitonin, ferritin, leukocytosis, tachycardia.  Concern for PE vs bacterial superinfection vs worsening COVID. COVID sx started 4 days PTA/11 days ago, worsening of COVID-19 disease seems less likely  - Check LE dopplers, CXR - on 4/8  Significant progression of bilateral infiltrates with jone consolidation now seen at the left lung base since 4/2/2020..  - C/W start therapeutic lovenox for empiric treatment of PE (4/8 -   CXR concerning for bacterial superinfection, Zosyn (4/8 -   Discussed with patient's granddaughter, family does not want plaquenil, wants trial of intubation if required if expected that pt can recover from bacterial pnuemonia and/or PE - confirmed on 4/9 with granddaughter and grandson   - Prone PRN

## 2020-04-09 NOTE — PROGRESS NOTE ADULT - PROBLEM SELECTOR PLAN 2
stable likely prerenal pre-renal i/s/o infection vs. post obstructive i/s/o BPH Increasing   - D/C lisinopril on 4.9   - F.U bladder scan to evaluate for retaining   -

## 2020-04-09 NOTE — CHART NOTE - NSCHARTNOTEFT_GEN_A_CORE
79M with PMH DM, HTN presented with SOB, CXR w/ b/l opacity, COVID+.    CODE BLUE called for pulselessness.    Patient on bed board and compressions and bag masking started at 30:2. Patient received 3 rounds of epi and 1 round of bicarb. Patient initially PEA on monitor, which turned into asystole.    Unable to achieve ROSC after performing ACLS protocol.     On physical exam patient did not respond to verbal or physical stimuli. No spontaneous respirations.  Absent heart and breath sounds. Absent radial, femoral, and carotid pulses.   Pupils are fixed and dilated absent SHUKRI, no corneal reflex.     Time of death 7:03PM. Family notified. Primary team at bedside.     Crista Sinha M.D.

## 2020-04-09 NOTE — CHART NOTE - NSCHARTNOTEFT_GEN_A_CORE
80 YO Male   Notified by nurse patient is DESAT to low 80s   Patient seen and examined at bedside   Patient is having belly breathing while laying on his left lateral position with NRB   Patient spoken to in [place]  Patient positioned into the prone position with pillows under him  Patient SAT on NRB went up to 96%  Patient mentating well and answering questions and asking questions   Patient BP: 130/70 HR 120s 92% on NRB   s/P 1mg morphine given   Will continue to monitor   Family wants Full code [Dr. Saavedra discussed with Family]       Case discussed with Dr. Saavedra  Will continue to follow

## 2020-04-09 NOTE — PROGRESS NOTE ADULT - PROBLEM SELECTOR PLAN 3
not on any regimens at home  low ISS for now.   check HbA1c- Now 7.0  Keep FS<180  DM diet not on any regimens at home  - Elevated finger sticks - will start 4 units of lantus   2 units TID + low ISS for now.   check HbA1c- Now 7.0  Keep FS<180  DM diet

## 2020-04-09 NOTE — PROGRESS NOTE ADULT - PROBLEM SELECTOR PLAN 5
BP low nl.   hold anti-hypertensives at this time.   restart home regimen when patient becoming hypertensive.  DASH diet
Pt with Na of 150  - Defer D50 given elavted sugars. Will give gentle fluids, 75 cc 1/2 NS for 10 hours

## 2020-04-09 NOTE — PROGRESS NOTE ADULT - PROBLEM SELECTOR PROBLEM 5
Essential hypertension
Hypernatremia

## 2020-04-09 NOTE — DISCHARGE NOTE FOR THE EXPIRED PATIENT - HOSPITAL COURSE
Patient was admitted on 4/2/20 with respiratory distress from COVID 19. Was treated with supportive care and O2 saturations worsened on NC until patient was transitioned to NRB early 4/8 AM. Family did not want plaquenil for patient. Zosyn was started on 4/8 and patient was started on therapeutic Lovenox for suspected PE. Respiratory status continued to decline on 4/9 but patient was still having O2 saturations in mid 90% when placed completely prone on NRB. Early evening on 4/9 patient went into cardiac arrest, code blue was called and resuscitation efforts were attempted with no recovery from asystole the whole time. Patient was pronounced dead at 19:03.

## 2020-04-09 NOTE — PROGRESS NOTE ADULT - PROBLEM SELECTOR PROBLEM 1
SOB (shortness of breath) on exertion

## 2020-04-09 NOTE — PROGRESS NOTE ADULT - SUBJECTIVE AND OBJECTIVE BOX
Medicine Progress Note    Patient is a 79y old  Male who presents with a chief complaint of SOB (08 Apr 2020 14:21)      SUBJECTIVE / OVERNIGHT EVENTS: Discussed with . Patient appears uncomfortable on NRB with tachypena.     ADDITIONAL REVIEW OF SYSTEMS:    MEDICATIONS  (STANDING):  dextrose 5%. 1000 milliLiter(s) (50 mL/Hr) IV Continuous <Continuous>  dextrose 50% Injectable 12.5 Gram(s) IV Push once  dextrose 50% Injectable 25 Gram(s) IV Push once  dextrose 50% Injectable 25 Gram(s) IV Push once  enoxaparin Injectable 90 milliGRAM(s) SubCutaneous every 12 hours  insulin lispro (HumaLOG) corrective regimen sliding scale   SubCutaneous three times a day before meals  insulin lispro (HumaLOG) corrective regimen sliding scale   SubCutaneous at bedtime  lisinopril 10 milliGRAM(s) Oral daily  piperacillin/tazobactam IVPB.. 3.375 Gram(s) IV Intermittent every 8 hours  tamsulosin 0.4 milliGRAM(s) Oral at bedtime    MEDICATIONS  (PRN):  acetaminophen   Tablet .. 650 milliGRAM(s) Oral every 4 hours PRN Temp greater or equal to 38.5C (101.3F)  acetaminophen  Suppository .. 650 milliGRAM(s) Rectal every 4 hours PRN Temp greater or equal to 38.5C (101.3F)  benzonatate 100 milliGRAM(s) Oral three times a day PRN Cough  dextrose 40% Gel 15 Gram(s) Oral once PRN Blood Glucose LESS THAN 70 milliGRAM(s)/deciliter  glucagon  Injectable 1 milliGRAM(s) IntraMuscular once PRN Glucose LESS THAN 70 milligrams/deciliter    CAPILLARY BLOOD GLUCOSE      POCT Blood Glucose.: 260 mg/dL (09 Apr 2020 09:03)  POCT Blood Glucose.: 244 mg/dL (08 Apr 2020 16:58)    I&O's Summary      PHYSICAL EXAM:  Vital Signs Last 24 Hrs  T(C): 36.2 (09 Apr 2020 11:15), Max: 36.2 (09 Apr 2020 11:15)  T(F): 97.2 (09 Apr 2020 11:15), Max: 97.2 (09 Apr 2020 11:15)  HR: 120 (09 Apr 2020 11:15) (120 - 125)  BP: 122/64 (09 Apr 2020 11:15) (122/64 - 127/58)  BP(mean): --  RR: 29 (09 Apr 2020 11:15) (29 - 34)  SpO2: 92% (09 Apr 2020 11:15) (84% - 92%)  CONSTITUTIONAL: NAD, well-developed, well-groomed  ENMT: Moist oral mucosa, no pharyngeal injection or exudates; normal dentition  RESPIRATORY: Normal respiratory effort; lungs are clear to auscultation bilaterally  CARDIOVASCULAR: Regular rate and rhythm, normal S1 and S2, no murmur/rub/gallop; No lower extremity edema; Peripheral pulses are 2+ bilaterally  ABDOMEN: Nontender to palpation, normoactive bowel sounds, no rebound/guarding; No hepatosplenomegaly  PSYCH: A+O to person, place, and time; affect appropriate  NEUROLOGY: CN 2-12 are intact and symmetric; no gross sensory deficits   SKIN: No rashes; no palpable lesions    LABS:                        11.3   29.21 )-----------( 280      ( 09 Apr 2020 09:00 )             34.0     04-09    150<H>  |  115<H>  |  68<H>  ----------------------------<  277<H>  4.7   |  16<L>  |  1.93<H>    Ca    10.5      09 Apr 2020 09:00  Phos  3.4     04-09  Mg     2.6     04-09                COVID-19 PCR: Detected (02 Apr 2020 02:46)      RADIOLOGY & ADDITIONAL TESTS:  Imaging from Last 24 Hours:    Electrocardiogram/QTc Interval:    COORDINATION OF CARE:  Care Discussed with Consultants/Other Providers: Medicine Progress Note    Patient is a 79y old  Male who presents with a chief complaint of SOB (08 Apr 2020 14:21)      SUBJECTIVE / OVERNIGHT EVENTS: Discussed with patient's grandaughter and grandson. . Patient appears uncomfortable on NRB with tachypnea. Confirmed GOC, family confirmed GOC. Discussed high mortality if cause of hypxoxia is coding vs. if potentially reversible from HAP or suspected PE. Endorsed understanding. Pt. reports breathing is okay. Reports needing to go to the bathroom.     ADDITIONAL REVIEW OF SYSTEMS:    MEDICATIONS  (STANDING):  dextrose 5%. 1000 milliLiter(s) (50 mL/Hr) IV Continuous <Continuous>  dextrose 50% Injectable 12.5 Gram(s) IV Push once  dextrose 50% Injectable 25 Gram(s) IV Push once  dextrose 50% Injectable 25 Gram(s) IV Push once  enoxaparin Injectable 90 milliGRAM(s) SubCutaneous every 12 hours  insulin lispro (HumaLOG) corrective regimen sliding scale   SubCutaneous three times a day before meals  insulin lispro (HumaLOG) corrective regimen sliding scale   SubCutaneous at bedtime  lisinopril 10 milliGRAM(s) Oral daily  piperacillin/tazobactam IVPB.. 3.375 Gram(s) IV Intermittent every 8 hours  tamsulosin 0.4 milliGRAM(s) Oral at bedtime    MEDICATIONS  (PRN):  acetaminophen   Tablet .. 650 milliGRAM(s) Oral every 4 hours PRN Temp greater or equal to 38.5C (101.3F)  acetaminophen  Suppository .. 650 milliGRAM(s) Rectal every 4 hours PRN Temp greater or equal to 38.5C (101.3F)  benzonatate 100 milliGRAM(s) Oral three times a day PRN Cough  dextrose 40% Gel 15 Gram(s) Oral once PRN Blood Glucose LESS THAN 70 milliGRAM(s)/deciliter  glucagon  Injectable 1 milliGRAM(s) IntraMuscular once PRN Glucose LESS THAN 70 milligrams/deciliter    CAPILLARY BLOOD GLUCOSE      POCT Blood Glucose.: 260 mg/dL (09 Apr 2020 09:03)  POCT Blood Glucose.: 244 mg/dL (08 Apr 2020 16:58)    I&O's Summary      PHYSICAL EXAM:  Vital Signs Last 24 Hrs  T(C): 36.2 (09 Apr 2020 11:15), Max: 36.2 (09 Apr 2020 11:15)  T(F): 97.2 (09 Apr 2020 11:15), Max: 97.2 (09 Apr 2020 11:15)  HR: 120 (09 Apr 2020 11:15) (120 - 125)  BP: 122/64 (09 Apr 2020 11:15) (122/64 - 127/58)  BP(mean): --  RR: 29 (09 Apr 2020 11:15) (29 - 34)  SpO2: 92% (09 Apr 2020 11:15) (84% - 92%)  CONSTITUTIONAL: Elderly man uncomfortably appearing    RESPIRATORY: Increased work of breathing on NRB, Normal respiratory effort; lungs are clear to auscultation bilaterally  CARDIOVASCULAR: Regular rate and rhythm, normal S1 and S2, no murmur/rub/gallop; No lower extremity edema; Peripheral pulses are 2+ bilaterally  ABDOMEN: Nontender to palpation, normoactive bowel sounds, no rebound/guarding; No hepatosplenomegaly  PSYCH:  affect appropriate    SKIN: No rashes; no palpable lesions    LABS:                        11.3   29.21 )-----------( 280      ( 09 Apr 2020 09:00 )             34.0     04-09    150<H>  |  115<H>  |  68<H>  ----------------------------<  277<H>  4.7   |  16<L>  |  1.93<H>    Ca    10.5      09 Apr 2020 09:00  Phos  3.4     04-09  Mg     2.6     04-09                COVID-19 PCR: Detected (02 Apr 2020 02:46)      RADIOLOGY & ADDITIONAL TESTS:  Imaging from Last 24 Hours:    Electrocardiogram/QTc Interval:    COORDINATION OF CARE:  Care Discussed with Consultants/Other Providers: Full code
